# Patient Record
Sex: MALE | Race: WHITE | Employment: PART TIME | URBAN - METROPOLITAN AREA
[De-identification: names, ages, dates, MRNs, and addresses within clinical notes are randomized per-mention and may not be internally consistent; named-entity substitution may affect disease eponyms.]

---

## 2020-09-18 ENCOUNTER — HOSPITAL ENCOUNTER (EMERGENCY)
Facility: CLINIC | Age: 50
Discharge: OTHER INSTITUTION | End: 2020-09-18
Attending: EMERGENCY MEDICINE
Payer: MEDICAID

## 2020-09-18 ENCOUNTER — HOSPITAL ENCOUNTER (EMERGENCY)
Facility: CLINIC | Age: 50
End: 2020-09-18

## 2020-09-18 ENCOUNTER — APPOINTMENT (OUTPATIENT)
Dept: CT IMAGING | Facility: CLINIC | Age: 50
End: 2020-09-18
Attending: EMERGENCY MEDICINE
Payer: MEDICAID

## 2020-09-18 ENCOUNTER — HOSPITAL ENCOUNTER (INPATIENT)
Facility: CLINIC | Age: 50
LOS: 7 days | Discharge: HOME | DRG: 896 | End: 2020-09-25
Attending: HOSPITALIST | Admitting: INTERNAL MEDICINE
Payer: MEDICAID

## 2020-09-18 ENCOUNTER — TELEPHONE (OUTPATIENT)
Dept: BEHAVIORAL HEALTH | Facility: CLINIC | Age: 50
End: 2020-09-18

## 2020-09-18 VITALS
SYSTOLIC BLOOD PRESSURE: 120 MMHG | TEMPERATURE: 97.6 F | RESPIRATION RATE: 18 BRPM | HEART RATE: 109 BPM | OXYGEN SATURATION: 99 % | DIASTOLIC BLOOD PRESSURE: 75 MMHG | WEIGHT: 168 LBS

## 2020-09-18 DIAGNOSIS — F10.931 ALCOHOL WITHDRAWAL, WITH DELIRIUM (H): ICD-10-CM

## 2020-09-18 DIAGNOSIS — R79.89 LFT ELEVATION: ICD-10-CM

## 2020-09-18 DIAGNOSIS — Z20.828 EXPOSURE TO SARS-ASSOCIATED CORONAVIRUS: ICD-10-CM

## 2020-09-18 DIAGNOSIS — E87.6 HYPOKALEMIA: ICD-10-CM

## 2020-09-18 DIAGNOSIS — F10.10 ETOH ABUSE: Primary | ICD-10-CM

## 2020-09-18 DIAGNOSIS — I10 BENIGN ESSENTIAL HYPERTENSION: ICD-10-CM

## 2020-09-18 PROBLEM — F10.939 ALCOHOL WITHDRAWAL (H): Status: ACTIVE | Noted: 2020-09-18

## 2020-09-18 LAB
ALBUMIN SERPL-MCNC: 3.3 G/DL (ref 3.4–5)
ALBUMIN UR-MCNC: NEGATIVE MG/DL
ALP SERPL-CCNC: 117 U/L (ref 40–150)
ALT SERPL W P-5'-P-CCNC: 85 U/L (ref 0–70)
ANION GAP SERPL CALCULATED.3IONS-SCNC: 10 MMOL/L (ref 3–14)
APPEARANCE UR: CLEAR
AST SERPL W P-5'-P-CCNC: 158 U/L (ref 0–45)
BASOPHILS # BLD AUTO: 0 10E9/L (ref 0–0.2)
BASOPHILS NFR BLD AUTO: 0.1 %
BILIRUB SERPL-MCNC: 2.1 MG/DL (ref 0.2–1.3)
BILIRUB UR QL STRIP: NEGATIVE
BUN SERPL-MCNC: 14 MG/DL (ref 7–30)
CALCIUM SERPL-MCNC: 8.9 MG/DL (ref 8.5–10.1)
CHLORIDE SERPL-SCNC: 90 MMOL/L (ref 94–109)
CK SERPL-CCNC: 315 U/L (ref 30–300)
CO2 SERPL-SCNC: 30 MMOL/L (ref 20–32)
COLOR UR AUTO: ABNORMAL
CREAT SERPL-MCNC: 0.59 MG/DL (ref 0.66–1.25)
DIFFERENTIAL METHOD BLD: ABNORMAL
EOSINOPHIL # BLD AUTO: 0 10E9/L (ref 0–0.7)
EOSINOPHIL NFR BLD AUTO: 0.3 %
ERYTHROCYTE [DISTWIDTH] IN BLOOD BY AUTOMATED COUNT: 12.1 % (ref 10–15)
ETHANOL SERPL-MCNC: <0.01 G/DL
GFR SERPL CREATININE-BSD FRML MDRD: >90 ML/MIN/{1.73_M2}
GLUCOSE SERPL-MCNC: 111 MG/DL (ref 70–99)
GLUCOSE UR STRIP-MCNC: 70 MG/DL
HCT VFR BLD AUTO: 35.3 % (ref 40–53)
HGB BLD-MCNC: 13.3 G/DL (ref 13.3–17.7)
HGB UR QL STRIP: NEGATIVE
IMM GRANULOCYTES # BLD: 0.1 10E9/L (ref 0–0.4)
IMM GRANULOCYTES NFR BLD: 0.6 %
KETONES UR STRIP-MCNC: 5 MG/DL
LABORATORY COMMENT REPORT: NORMAL
LACTATE BLD-SCNC: 1.8 MMOL/L (ref 0.7–2)
LEUKOCYTE ESTERASE UR QL STRIP: NEGATIVE
LYMPHOCYTES # BLD AUTO: 1 10E9/L (ref 0.8–5.3)
LYMPHOCYTES NFR BLD AUTO: 10.7 %
MAGNESIUM SERPL-MCNC: 2.2 MG/DL (ref 1.6–2.3)
MCH RBC QN AUTO: 34.9 PG (ref 26.5–33)
MCHC RBC AUTO-ENTMCNC: 37.7 G/DL (ref 31.5–36.5)
MCV RBC AUTO: 93 FL (ref 78–100)
MONOCYTES # BLD AUTO: 1.3 10E9/L (ref 0–1.3)
MONOCYTES NFR BLD AUTO: 14.2 %
NEUTROPHILS # BLD AUTO: 6.6 10E9/L (ref 1.6–8.3)
NEUTROPHILS NFR BLD AUTO: 74.1 %
NITRATE UR QL: NEGATIVE
NRBC # BLD AUTO: 0.1 10*3/UL
NRBC BLD AUTO-RTO: 1 /100
PH UR STRIP: 6 PH (ref 5–7)
PHOSPHATE SERPL-MCNC: 1.1 MG/DL (ref 2.5–4.5)
PLATELET # BLD AUTO: 256 10E9/L (ref 150–450)
POTASSIUM SERPL-SCNC: 2.4 MMOL/L (ref 3.4–5.3)
POTASSIUM SERPL-SCNC: 2.7 MMOL/L (ref 3.4–5.3)
PROT SERPL-MCNC: 6.6 G/DL (ref 6.8–8.8)
RBC # BLD AUTO: 3.81 10E12/L (ref 4.4–5.9)
SARS-COV-2 RNA SPEC QL NAA+PROBE: NEGATIVE
SARS-COV-2 RNA SPEC QL NAA+PROBE: NORMAL
SODIUM SERPL-SCNC: 130 MMOL/L (ref 133–144)
SOURCE: ABNORMAL
SP GR UR STRIP: 1 (ref 1–1.03)
SPECIMEN SOURCE: NORMAL
SPECIMEN SOURCE: NORMAL
UROBILINOGEN UR STRIP-MCNC: NORMAL MG/DL (ref 0–2)
WBC # BLD AUTO: 9 10E9/L (ref 4–11)

## 2020-09-18 PROCEDURE — 96361 HYDRATE IV INFUSION ADD-ON: CPT

## 2020-09-18 PROCEDURE — 96365 THER/PROPH/DIAG IV INF INIT: CPT

## 2020-09-18 PROCEDURE — 12000000 ZZH R&B MED SURG/OB

## 2020-09-18 PROCEDURE — 25000132 ZZH RX MED GY IP 250 OP 250 PS 637: Performed by: EMERGENCY MEDICINE

## 2020-09-18 PROCEDURE — 36415 COLL VENOUS BLD VENIPUNCTURE: CPT

## 2020-09-18 PROCEDURE — C9803 HOPD COVID-19 SPEC COLLECT: HCPCS

## 2020-09-18 PROCEDURE — 25000128 H RX IP 250 OP 636: Performed by: EMERGENCY MEDICINE

## 2020-09-18 PROCEDURE — 93005 ELECTROCARDIOGRAM TRACING: CPT | Mod: 76

## 2020-09-18 PROCEDURE — 93005 ELECTROCARDIOGRAM TRACING: CPT

## 2020-09-18 PROCEDURE — 36415 COLL VENOUS BLD VENIPUNCTURE: CPT | Performed by: INTERNAL MEDICINE

## 2020-09-18 PROCEDURE — U0003 INFECTIOUS AGENT DETECTION BY NUCLEIC ACID (DNA OR RNA); SEVERE ACUTE RESPIRATORY SYNDROME CORONAVIRUS 2 (SARS-COV-2) (CORONAVIRUS DISEASE [COVID-19]), AMPLIFIED PROBE TECHNIQUE, MAKING USE OF HIGH THROUGHPUT TECHNOLOGIES AS DESCRIBED BY CMS-2020-01-R: HCPCS | Performed by: EMERGENCY MEDICINE

## 2020-09-18 PROCEDURE — 70450 CT HEAD/BRAIN W/O DYE: CPT

## 2020-09-18 PROCEDURE — 80053 COMPREHEN METABOLIC PANEL: CPT | Performed by: EMERGENCY MEDICINE

## 2020-09-18 PROCEDURE — 99223 1ST HOSP IP/OBS HIGH 75: CPT | Mod: AI | Performed by: INTERNAL MEDICINE

## 2020-09-18 PROCEDURE — 99285 EMERGENCY DEPT VISIT HI MDM: CPT | Mod: 25 | Performed by: EMERGENCY MEDICINE

## 2020-09-18 PROCEDURE — 83605 ASSAY OF LACTIC ACID: CPT | Performed by: EMERGENCY MEDICINE

## 2020-09-18 PROCEDURE — 83735 ASSAY OF MAGNESIUM: CPT | Performed by: EMERGENCY MEDICINE

## 2020-09-18 PROCEDURE — 93010 ELECTROCARDIOGRAM REPORT: CPT | Mod: 76 | Performed by: EMERGENCY MEDICINE

## 2020-09-18 PROCEDURE — 99285 EMERGENCY DEPT VISIT HI MDM: CPT | Mod: 25

## 2020-09-18 PROCEDURE — 80320 DRUG SCREEN QUANTALCOHOLS: CPT | Performed by: EMERGENCY MEDICINE

## 2020-09-18 PROCEDURE — 25800030 ZZH RX IP 258 OP 636: Performed by: EMERGENCY MEDICINE

## 2020-09-18 PROCEDURE — 25000132 ZZH RX MED GY IP 250 OP 250 PS 637: Performed by: INTERNAL MEDICINE

## 2020-09-18 PROCEDURE — 82550 ASSAY OF CK (CPK): CPT | Performed by: EMERGENCY MEDICINE

## 2020-09-18 PROCEDURE — 81003 URINALYSIS AUTO W/O SCOPE: CPT | Mod: XU | Performed by: EMERGENCY MEDICINE

## 2020-09-18 PROCEDURE — 96366 THER/PROPH/DIAG IV INF ADDON: CPT

## 2020-09-18 PROCEDURE — HZ2ZZZZ DETOXIFICATION SERVICES FOR SUBSTANCE ABUSE TREATMENT: ICD-10-PCS | Performed by: INTERNAL MEDICINE

## 2020-09-18 PROCEDURE — 93010 ELECTROCARDIOGRAM REPORT: CPT | Mod: Z6 | Performed by: EMERGENCY MEDICINE

## 2020-09-18 PROCEDURE — 25000128 H RX IP 250 OP 636: Performed by: INTERNAL MEDICINE

## 2020-09-18 PROCEDURE — 84132 ASSAY OF SERUM POTASSIUM: CPT | Performed by: INTERNAL MEDICINE

## 2020-09-18 PROCEDURE — 25000125 ZZHC RX 250: Performed by: EMERGENCY MEDICINE

## 2020-09-18 PROCEDURE — 85025 COMPLETE CBC W/AUTO DIFF WBC: CPT | Performed by: EMERGENCY MEDICINE

## 2020-09-18 PROCEDURE — 84100 ASSAY OF PHOSPHORUS: CPT | Performed by: INTERNAL MEDICINE

## 2020-09-18 RX ORDER — POTASSIUM CHLORIDE 7.45 MG/ML
10 INJECTION INTRAVENOUS
Status: DISCONTINUED | OUTPATIENT
Start: 2020-09-18 | End: 2020-09-25 | Stop reason: HOSPADM

## 2020-09-18 RX ORDER — MULTIPLE VITAMINS W/ MINERALS TAB 9MG-400MCG
1 TAB ORAL DAILY
Status: DISCONTINUED | OUTPATIENT
Start: 2020-09-19 | End: 2020-09-25 | Stop reason: HOSPADM

## 2020-09-18 RX ORDER — FOLIC ACID 1 MG/1
1 TABLET ORAL DAILY
Status: DISCONTINUED | OUTPATIENT
Start: 2020-09-19 | End: 2020-09-25 | Stop reason: HOSPADM

## 2020-09-18 RX ORDER — GABAPENTIN 300 MG/1
600 CAPSULE ORAL EVERY 8 HOURS
Status: DISCONTINUED | OUTPATIENT
Start: 2020-09-22 | End: 2020-09-21

## 2020-09-18 RX ORDER — PROCHLORPERAZINE 25 MG
25 SUPPOSITORY, RECTAL RECTAL EVERY 12 HOURS PRN
Status: DISCONTINUED | OUTPATIENT
Start: 2020-09-18 | End: 2020-09-25 | Stop reason: HOSPADM

## 2020-09-18 RX ORDER — LORAZEPAM 2 MG/ML
1-2 INJECTION INTRAMUSCULAR EVERY 30 MIN PRN
Status: DISCONTINUED | OUTPATIENT
Start: 2020-09-18 | End: 2020-09-25 | Stop reason: HOSPADM

## 2020-09-18 RX ORDER — GABAPENTIN 300 MG/1
300 CAPSULE ORAL EVERY 8 HOURS
Status: DISCONTINUED | OUTPATIENT
Start: 2020-09-24 | End: 2020-09-21

## 2020-09-18 RX ORDER — GABAPENTIN 300 MG/1
900 CAPSULE ORAL EVERY 8 HOURS
Status: DISCONTINUED | OUTPATIENT
Start: 2020-09-19 | End: 2020-09-21

## 2020-09-18 RX ORDER — POTASSIUM CL/LIDO/0.9 % NACL 10MEQ/0.1L
10 INTRAVENOUS SOLUTION, PIGGYBACK (ML) INTRAVENOUS
Status: DISCONTINUED | OUTPATIENT
Start: 2020-09-18 | End: 2020-09-25 | Stop reason: HOSPADM

## 2020-09-18 RX ORDER — PROCHLORPERAZINE MALEATE 10 MG
10 TABLET ORAL EVERY 6 HOURS PRN
Status: DISCONTINUED | OUTPATIENT
Start: 2020-09-18 | End: 2020-09-25 | Stop reason: HOSPADM

## 2020-09-18 RX ORDER — NALOXONE HYDROCHLORIDE 0.4 MG/ML
.1-.4 INJECTION, SOLUTION INTRAMUSCULAR; INTRAVENOUS; SUBCUTANEOUS
Status: DISCONTINUED | OUTPATIENT
Start: 2020-09-18 | End: 2020-09-25 | Stop reason: HOSPADM

## 2020-09-18 RX ORDER — LANOLIN ALCOHOL/MO/W.PET/CERES
100 CREAM (GRAM) TOPICAL DAILY
Status: DISCONTINUED | OUTPATIENT
Start: 2020-09-26 | End: 2020-09-25 | Stop reason: HOSPADM

## 2020-09-18 RX ORDER — POTASSIUM CHLORIDE 1.5 G/1.58G
40 POWDER, FOR SOLUTION ORAL ONCE
Status: COMPLETED | OUTPATIENT
Start: 2020-09-18 | End: 2020-09-18

## 2020-09-18 RX ORDER — LIDOCAINE 40 MG/G
CREAM TOPICAL
Status: DISCONTINUED | OUTPATIENT
Start: 2020-09-18 | End: 2020-09-25 | Stop reason: HOSPADM

## 2020-09-18 RX ORDER — LANOLIN ALCOHOL/MO/W.PET/CERES
100 CREAM (GRAM) TOPICAL 3 TIMES DAILY
Status: DISCONTINUED | OUTPATIENT
Start: 2020-09-20 | End: 2020-09-25 | Stop reason: HOSPADM

## 2020-09-18 RX ORDER — POLYETHYLENE GLYCOL 3350 17 G/17G
17 POWDER, FOR SOLUTION ORAL DAILY PRN
Status: DISCONTINUED | OUTPATIENT
Start: 2020-09-18 | End: 2020-09-25 | Stop reason: HOSPADM

## 2020-09-18 RX ORDER — AMOXICILLIN 250 MG
2 CAPSULE ORAL 2 TIMES DAILY PRN
Status: DISCONTINUED | OUTPATIENT
Start: 2020-09-18 | End: 2020-09-25 | Stop reason: HOSPADM

## 2020-09-18 RX ORDER — HALOPERIDOL 5 MG/ML
2.5-5 INJECTION INTRAMUSCULAR EVERY 6 HOURS PRN
Status: DISCONTINUED | OUTPATIENT
Start: 2020-09-18 | End: 2020-09-25 | Stop reason: HOSPADM

## 2020-09-18 RX ORDER — METOPROLOL TARTRATE 50 MG
50 TABLET ORAL 2 TIMES DAILY
Status: DISCONTINUED | OUTPATIENT
Start: 2020-09-19 | End: 2020-09-25 | Stop reason: HOSPADM

## 2020-09-18 RX ORDER — LANOLIN ALCOHOL/MO/W.PET/CERES
200 CREAM (GRAM) TOPICAL 3 TIMES DAILY
Status: COMPLETED | OUTPATIENT
Start: 2020-09-18 | End: 2020-09-20

## 2020-09-18 RX ORDER — AMOXICILLIN 250 MG
1 CAPSULE ORAL 2 TIMES DAILY PRN
Status: DISCONTINUED | OUTPATIENT
Start: 2020-09-18 | End: 2020-09-25 | Stop reason: HOSPADM

## 2020-09-18 RX ORDER — MULTIPLE VITAMINS W/ MINERALS TAB 9MG-400MCG
1 TAB ORAL DAILY
COMMUNITY

## 2020-09-18 RX ORDER — POTASSIUM CHLORIDE 29.8 MG/ML
20 INJECTION INTRAVENOUS
Status: DISCONTINUED | OUTPATIENT
Start: 2020-09-18 | End: 2020-09-25 | Stop reason: HOSPADM

## 2020-09-18 RX ORDER — ONDANSETRON 2 MG/ML
4 INJECTION INTRAMUSCULAR; INTRAVENOUS EVERY 6 HOURS PRN
Status: DISCONTINUED | OUTPATIENT
Start: 2020-09-18 | End: 2020-09-25 | Stop reason: HOSPADM

## 2020-09-18 RX ORDER — GABAPENTIN 100 MG/1
100 CAPSULE ORAL EVERY 8 HOURS
Status: DISCONTINUED | OUTPATIENT
Start: 2020-09-26 | End: 2020-09-21

## 2020-09-18 RX ORDER — ONDANSETRON 4 MG/1
4 TABLET, ORALLY DISINTEGRATING ORAL EVERY 6 HOURS PRN
Status: DISCONTINUED | OUTPATIENT
Start: 2020-09-18 | End: 2020-09-25 | Stop reason: HOSPADM

## 2020-09-18 RX ORDER — POTASSIUM CHLORIDE 1.5 G/1.58G
20-40 POWDER, FOR SOLUTION ORAL
Status: DISCONTINUED | OUTPATIENT
Start: 2020-09-18 | End: 2020-09-25 | Stop reason: HOSPADM

## 2020-09-18 RX ORDER — LORAZEPAM 1 MG/1
1-2 TABLET ORAL EVERY 30 MIN PRN
Status: DISCONTINUED | OUTPATIENT
Start: 2020-09-18 | End: 2020-09-25 | Stop reason: HOSPADM

## 2020-09-18 RX ORDER — FLUMAZENIL 0.1 MG/ML
0.2 INJECTION, SOLUTION INTRAVENOUS
Status: DISCONTINUED | OUTPATIENT
Start: 2020-09-18 | End: 2020-09-25 | Stop reason: HOSPADM

## 2020-09-18 RX ORDER — POTASSIUM CHLORIDE 1500 MG/1
20-40 TABLET, EXTENDED RELEASE ORAL
Status: DISCONTINUED | OUTPATIENT
Start: 2020-09-18 | End: 2020-09-25 | Stop reason: HOSPADM

## 2020-09-18 RX ORDER — SODIUM CHLORIDE AND POTASSIUM CHLORIDE 150; 900 MG/100ML; MG/100ML
INJECTION, SOLUTION INTRAVENOUS CONTINUOUS
Status: DISCONTINUED | OUTPATIENT
Start: 2020-09-18 | End: 2020-09-21

## 2020-09-18 RX ORDER — OLANZAPINE 5 MG/1
5-10 TABLET, ORALLY DISINTEGRATING ORAL EVERY 6 HOURS PRN
Status: DISCONTINUED | OUTPATIENT
Start: 2020-09-18 | End: 2020-09-25 | Stop reason: HOSPADM

## 2020-09-18 RX ORDER — LORAZEPAM 1 MG/1
2 TABLET ORAL ONCE
Status: COMPLETED | OUTPATIENT
Start: 2020-09-18 | End: 2020-09-18

## 2020-09-18 RX ORDER — METOPROLOL TARTRATE 50 MG
50 TABLET ORAL 2 TIMES DAILY
Status: ON HOLD | COMMUNITY
Start: 2020-08-07 | End: 2020-09-25

## 2020-09-18 RX ORDER — MAGNESIUM SULFATE HEPTAHYDRATE 40 MG/ML
4 INJECTION, SOLUTION INTRAVENOUS EVERY 4 HOURS PRN
Status: DISCONTINUED | OUTPATIENT
Start: 2020-09-18 | End: 2020-09-25 | Stop reason: HOSPADM

## 2020-09-18 RX ORDER — SODIUM CHLORIDE 9 MG/ML
INJECTION, SOLUTION INTRAVENOUS CONTINUOUS
Status: DISCONTINUED | OUTPATIENT
Start: 2020-09-18 | End: 2020-09-18 | Stop reason: HOSPADM

## 2020-09-18 RX ORDER — GABAPENTIN 600 MG/1
1200 TABLET ORAL ONCE
Status: COMPLETED | OUTPATIENT
Start: 2020-09-18 | End: 2020-09-18

## 2020-09-18 RX ADMIN — POTASSIUM CHLORIDE 40 MEQ: 1.5 POWDER, FOR SOLUTION ORAL at 16:31

## 2020-09-18 RX ADMIN — GABAPENTIN 1200 MG: 600 TABLET, FILM COATED ORAL at 20:09

## 2020-09-18 RX ADMIN — POTASSIUM CHLORIDE AND SODIUM CHLORIDE: 900; 150 INJECTION, SOLUTION INTRAVENOUS at 21:11

## 2020-09-18 RX ADMIN — POTASSIUM CHLORIDE 40 MEQ: 1.5 POWDER, FOR SOLUTION ORAL at 14:05

## 2020-09-18 RX ADMIN — THIAMINE HYDROCHLORIDE: 100 INJECTION, SOLUTION INTRAMUSCULAR; INTRAVENOUS at 14:45

## 2020-09-18 RX ADMIN — LORAZEPAM 1 MG: 1 TABLET ORAL at 20:09

## 2020-09-18 RX ADMIN — SODIUM CHLORIDE 1000 ML: 9 INJECTION, SOLUTION INTRAVENOUS at 13:23

## 2020-09-18 RX ADMIN — LORAZEPAM 2 MG: 1 TABLET ORAL at 16:29

## 2020-09-18 RX ADMIN — POTASSIUM CHLORIDE 40 MEQ: 1.5 POWDER, FOR SOLUTION ORAL at 21:54

## 2020-09-18 RX ADMIN — THIAMINE HCL TAB 100 MG 200 MG: 100 TAB at 21:54

## 2020-09-18 RX ADMIN — SODIUM CHLORIDE 1000 ML: 9 INJECTION, SOLUTION INTRAVENOUS at 17:37

## 2020-09-18 ASSESSMENT — ACTIVITIES OF DAILY LIVING (ADL): ADLS_ACUITY_SCORE: 18

## 2020-09-18 ASSESSMENT — ENCOUNTER SYMPTOMS
DIFFICULTY URINATING: 0
NECK STIFFNESS: 0
COLOR CHANGE: 0
HALLUCINATIONS: 1
ARTHRALGIAS: 0
SHORTNESS OF BREATH: 0
VOMITING: 0
HEADACHES: 0
ABDOMINAL PAIN: 0
CONFUSION: 0
EYE REDNESS: 0
FEVER: 0
NAUSEA: 0

## 2020-09-18 NOTE — ED PROVIDER NOTES
ED Provider Note  [facility]      History     Chief Complaint   Patient presents with     Alcohol Problem     Last drink this weekend.  States he drinks 1 beer ad 6 shots per day.  Varies.  Started drinking last Dec/ Jan.  Drinks @ night.     Chest Pain     Left sided chewst pain.  States it started last night.  Comes and goes.  Lasts about 1 1/2 hours.  Bruise noted to left anterior chest.     Fall     Pt has bruises to left chest, left shoulder and left arm.     The history is provided by the patient and a parent.     Omega Guthrie is a 50 year old male with a history of hypertension and alcohol abuse who presents to the Emergency Department seeking detox from alcohol. The patient states that he has been drinking 3-4 shots of hard liquor and a beer every other day, his last drink was yesterday. The patient states he started drinking again 6 months ago. He states that he relapsed from a 6-year sobriety. The patient admits to hallucinations that began this morning, but the patient's mother states he was hallucinating yesterday. The patient has a history of alcohol withdrawal seizures and DTs. He reports his last instance of this was in December 2009. He denies nausea and vomiting. He denies the use of other drugs.     The patient reports that he has fallen twice in the last and a half weeks, and reports a large bruise to his left chest with associated chest pain.     Past Medical History  Past Medical History:   Diagnosis Date     Hypertension      Seizures (H)      Substance abuse (H) 2010    Rehab     Past Surgical History:   Procedure Laterality Date     EYE SURGERY       metoprolol tartrate (LOPRESSOR) 50 MG tablet  multivitamin w/minerals (MULTI-VITAMIN) tablet      Allergies   Allergen Reactions     Penicillins Other (See Comments)     Facial rash and pain     Family History  No family history on file.  Social History   Social History     Tobacco Use     Smoking status: Never  Smoker     Smokeless tobacco: Never Used   Substance Use Topics     Alcohol use: Yes     Drug use: Never      Past medical history, past surgical history, medications, allergies, family history, and social history were reviewed with the patient. No additional pertinent items.       Review of Systems   Constitutional: Negative for fever.   HENT: Negative for congestion.    Eyes: Negative for redness.   Respiratory: Negative for shortness of breath.    Cardiovascular: Negative for chest pain.   Gastrointestinal: Negative for abdominal pain, nausea and vomiting.   Genitourinary: Negative for difficulty urinating.   Musculoskeletal: Negative for arthralgias and neck stiffness.   Skin: Negative for color change.        Positive for bruise to L chest w/associated pain   Neurological: Negative for headaches.   Psychiatric/Behavioral: Positive for hallucinations. Negative for confusion.   All other systems reviewed and are negative.    Physical Exam   BP: (!) 123/92  Pulse: 93  Temp: 97  F (36.1  C)  Resp: 16  Weight: 76.2 kg (168 lb)  SpO2: 98 %  Physical Exam  Constitutional:       General: He is not in acute distress.     Appearance: He is not diaphoretic.   HENT:      Head: Atraumatic.   Eyes:      General: No scleral icterus.     Pupils: Pupils are equal, round, and reactive to light.   Cardiovascular:      Heart sounds: Normal heart sounds.   Pulmonary:      Effort: No respiratory distress.      Breath sounds: Normal breath sounds.   Abdominal:      General: Bowel sounds are normal.      Palpations: Abdomen is soft.      Tenderness: There is no abdominal tenderness.   Musculoskeletal:         General: No tenderness.   Skin:     General: Skin is warm.      Findings: No rash.   Neurological:      Motor: Tremor present.         ED Course       12:47 PM  The patient was seen and examined by Carlos Cutler MD in Room ED11.   Procedures             EKG Interpretation:      Interpreted by Carlos Cutler MD  Time reviewed:  12:44 PM  Symptoms at time of EKG: Left-sided chest pain  Rhythm: Normal sinus   Rate: Normal  Axis: Normal  Ectopy: None  Conduction: Normal  ST Segments/ T Waves: No ST-T wave changes, No acute ischemic changes and QTc prolongation   Q Waves: None  Comparison to prior: No old EKG available    Clinical Impression: no acute changes           EKG Interpretation:      Interpreted by Carlos Cutler MD  Time reviewed: 2:32 PM  Symptoms at time of EKG: Left-sided chest pain  Rhythm: Normal sinus   Rate: Normal  Axis: Normal  Ectopy: None  Conduction: Normal and Left posterior fasciclar block  ST Segments/ T Waves: No ST-T wave changes and No acute ischemic changes, QTC prolongation  Q Waves: None  Comparison to prior: Compared with EKG from 2 hours earlier, QRS deflection in leads I and aVL are now negative whereas aVR is now positive    Clinical Impression: no acute changes             Results for orders placed or performed during the hospital encounter of 09/18/20   CBC with platelets differential     Status: Abnormal   Result Value Ref Range    WBC 9.0 4.0 - 11.0 10e9/L    RBC Count 3.81 (L) 4.4 - 5.9 10e12/L    Hemoglobin 13.3 13.3 - 17.7 g/dL    Hematocrit 35.3 (L) 40.0 - 53.0 %    MCV 93 78 - 100 fl    MCH 34.9 (H) 26.5 - 33.0 pg    MCHC 37.7 (H) 31.5 - 36.5 g/dL    RDW 12.1 10.0 - 15.0 %    Platelet Count 256 150 - 450 10e9/L    Diff Method Automated Method     % Neutrophils 74.1 %    % Lymphocytes 10.7 %    % Monocytes 14.2 %    % Eosinophils 0.3 %    % Basophils 0.1 %    % Immature Granulocytes 0.6 %    Nucleated RBCs 1 (H) 0 /100    Absolute Neutrophil 6.6 1.6 - 8.3 10e9/L    Absolute Lymphocytes 1.0 0.8 - 5.3 10e9/L    Absolute Monocytes 1.3 0.0 - 1.3 10e9/L    Absolute Eosinophils 0.0 0.0 - 0.7 10e9/L    Absolute Basophils 0.0 0.0 - 0.2 10e9/L    Abs Immature Granulocytes 0.1 0 - 0.4 10e9/L    Absolute Nucleated RBC 0.1    Comprehensive metabolic panel     Status: Abnormal   Result Value Ref Range    Sodium  130 (L) 133 - 144 mmol/L    Potassium 2.4 (LL) 3.4 - 5.3 mmol/L    Chloride 90 (L) 94 - 109 mmol/L    Carbon Dioxide 30 20 - 32 mmol/L    Anion Gap 10 3 - 14 mmol/L    Glucose 111 (H) 70 - 99 mg/dL    Urea Nitrogen 14 7 - 30 mg/dL    Creatinine 0.59 (L) 0.66 - 1.25 mg/dL    GFR Estimate >90 >60 mL/min/[1.73_m2]    GFR Estimate If Black >90 >60 mL/min/[1.73_m2]    Calcium 8.9 8.5 - 10.1 mg/dL    Bilirubin Total 2.1 (H) 0.2 - 1.3 mg/dL    Albumin 3.3 (L) 3.4 - 5.0 g/dL    Protein Total 6.6 (L) 6.8 - 8.8 g/dL    Alkaline Phosphatase 117 40 - 150 U/L    ALT 85 (H) 0 - 70 U/L     (H) 0 - 45 U/L   Lactic acid whole blood     Status: None   Result Value Ref Range    Lactic Acid 1.8 0.7 - 2.0 mmol/L   Alcohol ethyl     Status: None   Result Value Ref Range    Ethanol g/dL <0.01 <0.01 g/dL   Magnesium     Status: None   Result Value Ref Range    Magnesium 2.2 1.6 - 2.3 mg/dL   CK total     Status: Abnormal   Result Value Ref Range    CK Total 315 (H) 30 - 300 U/L   Asymptomatic COVID-19 Virus (Coronavirus) by PCR     Status: None    Specimen: Nasopharyngeal   Result Value Ref Range    COVID-19 Virus PCR to [facility] - Source Nasopharyngeal     COVID-19 Virus PCR to [facility] - Result       Test received-See reflex to IDDL test SARS CoV2 (COVID-19) Virus RT-PCR   EKG 12 lead     Status: None (Preliminary result)   Result Value Ref Range    Interpretation ECG Click View Image link to view waveform and result    EKG 12 lead     Status: None (Preliminary result)   Result Value Ref Range    Interpretation ECG Click View Image link to view waveform and result      Medications   0.9% sodium chloride BOLUS (0 mLs Intravenous Stopped 9/18/20 1423)     Followed by   0.9% sodium chloride BOLUS (has no administration in time range)     Followed by   sodium chloride 0.9% infusion (has no administration in time range)   LORazepam (ATIVAN) tablet 2 mg (has no administration in time range)   potassium chloride (KLOR-CON) Packet 40 mEq  (has no administration in time range)   sodium chloride 0.9 % 1,000 mL with Infuvite Adult 10 mL, thiamine 100 mg, folic acid 1 mg, magnesium sulfate 2 g infusion ( Intravenous New Bag 9/18/20 1445)   potassium chloride (KLOR-CON) Packet 40 mEq (40 mEq Oral Given 9/18/20 1405)        Assessments & Plan (with Medical Decision Making)   50-year-old male presents with tremulousness and confusion in the setting of recent cessation from heavy use of alcohol.  Exam revealed the patient to be oriented to person with tremulousness.  He was noted to have bruising over his anterior chest and left shoulder consistent with recent falls.  Laboratories were obtained including comprehensive metabolic panel revealing hypokalemia as well as elevations in bilirubin, ALT and AST consistent with acute on or chronic alcohol abuse.  CBC was grossly unremarkable.  Patient was treated with IV normal saline, Ativan, potassium, magnesium, thiamine, folate.  He received a second dose of potassium.  The case was discussed at length with hospitalist from [facility].  Patient was agreeable to transportation there.  Appropriate paperwork was filled out.  I have reviewed the nursing notes. I have reviewed the findings, diagnosis, plan and need for follow up with the patient.    New Prescriptions    No medications on file       Final diagnoses:   Alcohol withdrawal, with delirium (H)   Hypokalemia   LFT elevation     I, Sunni Gordon, am serving as a trained medical scribe to document services personally performed by Carlos Cutler MD, based on the provider's statements to me.     I, Carlos Cutler MD, was physically present and have reviewed and verified the accuracy of this note documented by Sunni Gordon.    --  Carlos Cutler MD  [facility], EMERGENCY DEPARTMENT  9/18/2020     Carlos Cutler MD  09/18/20 160       Carlos Cutler MD  09/21/20 1108

## 2020-09-18 NOTE — TELEPHONE ENCOUNTER
S: 1:30 PM  Call from ED provider requesting detox bed for a 49 YO M     B:  Patient relapsed w/ETOH 5 years ago, drinking intermittently and started drinking daily 6 months ago- reports drinking multiple shots and beers daily-last drink 9/17/20-unknown time. Patient's  mom reports he was shakey, and hallucinating yesterday.  Patient does have a history of ETOH withdrawl  seizures and DTs in 2009.       Hx of HTN-takes medication--/98  Today at 1 PM    Labs:  Completed  Lactic acid-1.8  CBC w/ platelets-   RBC-(L) 3.81 hematocrit-(L)35.3  MCH-(H) 34.9 MCHC-(H) 37.7,  Nucleated RBCs-(H)1    Labs:  In Process  COVID-19  CK Total  Magnesium  Alcohol Ethyl  CMP    Labs:  Not collected yet  UA    Patient started on MSSA protocol    A:  voluntary    R:  Patient cleared and ready for detox  bed placement: No  Lab work still pending    R:  2:30 PM  Lab work not back yet.

## 2020-09-18 NOTE — PHARMACY-ADMISSION MEDICATION HISTORY
Admission Medication History Completed by Pharmacy    See [facility] Admission Navigator for allergy information, preferred outpatient pharmacy, prior to admission medications and immunization status.     Medication History Sources:     Patient    Care Everywhere ([facility])    Changes made to PTA medication list (reason):    Added: None    Deleted: None    Changed: None    Additional Information:    None    Prior to Admission medications    Medication Sig Last Dose Taking? Auth Provider   metoprolol tartrate (LOPRESSOR) 50 MG tablet Take 50 mg by mouth 2 times daily 9/17/2020 at PM Yes Reported, Patient   multivitamin w/minerals (MULTI-VITAMIN) tablet Take 1 tablet by mouth daily Past Week at Unknown time Yes Reported, Patient       Date completed: 09/18/20    Medication history completed by: Allie Argueta [facility]

## 2020-09-19 LAB
ALBUMIN SERPL-MCNC: 2.8 G/DL (ref 3.4–5)
ALP SERPL-CCNC: 98 U/L (ref 40–150)
ALT SERPL W P-5'-P-CCNC: 76 U/L (ref 0–70)
ANION GAP SERPL CALCULATED.3IONS-SCNC: 4 MMOL/L (ref 3–14)
AST SERPL W P-5'-P-CCNC: 121 U/L (ref 0–45)
BILIRUB SERPL-MCNC: 1.5 MG/DL (ref 0.2–1.3)
BUN SERPL-MCNC: 6 MG/DL (ref 7–30)
CALCIUM SERPL-MCNC: 8 MG/DL (ref 8.5–10.1)
CHLORIDE SERPL-SCNC: 107 MMOL/L (ref 94–109)
CK SERPL-CCNC: 144 U/L (ref 30–300)
CO2 SERPL-SCNC: 28 MMOL/L (ref 20–32)
CREAT SERPL-MCNC: 0.68 MG/DL (ref 0.66–1.25)
ERYTHROCYTE [DISTWIDTH] IN BLOOD BY AUTOMATED COUNT: 12.6 % (ref 10–15)
GFR SERPL CREATININE-BSD FRML MDRD: >90 ML/MIN/{1.73_M2}
GLUCOSE SERPL-MCNC: 94 MG/DL (ref 70–99)
HCT VFR BLD AUTO: 33.3 % (ref 40–53)
HGB BLD-MCNC: 11.3 G/DL (ref 13.3–17.7)
INTERPRETATION ECG - MUSE: NORMAL
INTERPRETATION ECG - MUSE: NORMAL
MCH RBC QN AUTO: 34 PG (ref 26.5–33)
MCHC RBC AUTO-ENTMCNC: 33.9 G/DL (ref 31.5–36.5)
MCV RBC AUTO: 100 FL (ref 78–100)
PHOSPHATE SERPL-MCNC: 2 MG/DL (ref 2.5–4.5)
PLATELET # BLD AUTO: 226 10E9/L (ref 150–450)
POTASSIUM SERPL-SCNC: 4 MMOL/L (ref 3.4–5.3)
PROT SERPL-MCNC: 5.7 G/DL (ref 6.8–8.8)
RBC # BLD AUTO: 3.32 10E12/L (ref 4.4–5.9)
SODIUM SERPL-SCNC: 139 MMOL/L (ref 133–144)
WBC # BLD AUTO: 6.7 10E9/L (ref 4–11)

## 2020-09-19 PROCEDURE — 82550 ASSAY OF CK (CPK): CPT | Performed by: INTERNAL MEDICINE

## 2020-09-19 PROCEDURE — 85027 COMPLETE CBC AUTOMATED: CPT | Performed by: INTERNAL MEDICINE

## 2020-09-19 PROCEDURE — 99232 SBSQ HOSP IP/OBS MODERATE 35: CPT | Performed by: INTERNAL MEDICINE

## 2020-09-19 PROCEDURE — 25000128 H RX IP 250 OP 636: Performed by: INTERNAL MEDICINE

## 2020-09-19 PROCEDURE — 25000132 ZZH RX MED GY IP 250 OP 250 PS 637: Performed by: INTERNAL MEDICINE

## 2020-09-19 PROCEDURE — 80053 COMPREHEN METABOLIC PANEL: CPT | Performed by: INTERNAL MEDICINE

## 2020-09-19 PROCEDURE — 25000125 ZZHC RX 250: Performed by: INTERNAL MEDICINE

## 2020-09-19 PROCEDURE — 12000000 ZZH R&B MED SURG/OB

## 2020-09-19 PROCEDURE — 36415 COLL VENOUS BLD VENIPUNCTURE: CPT | Performed by: INTERNAL MEDICINE

## 2020-09-19 PROCEDURE — 25800030 ZZH RX IP 258 OP 636: Performed by: INTERNAL MEDICINE

## 2020-09-19 PROCEDURE — 84100 ASSAY OF PHOSPHORUS: CPT | Performed by: INTERNAL MEDICINE

## 2020-09-19 RX ORDER — TAMSULOSIN HYDROCHLORIDE 0.4 MG/1
0.4 CAPSULE ORAL DAILY
Status: DISCONTINUED | OUTPATIENT
Start: 2020-09-19 | End: 2020-09-25 | Stop reason: HOSPADM

## 2020-09-19 RX ORDER — LORAZEPAM 0.5 MG/1
0.5 TABLET ORAL EVERY 8 HOURS
Status: DISCONTINUED | OUTPATIENT
Start: 2020-09-19 | End: 2020-09-21

## 2020-09-19 RX ORDER — ACETAMINOPHEN 325 MG/1
650 TABLET ORAL EVERY 4 HOURS PRN
Status: DISCONTINUED | OUTPATIENT
Start: 2020-09-19 | End: 2020-09-25 | Stop reason: HOSPADM

## 2020-09-19 RX ORDER — ACETAMINOPHEN 650 MG/1
650 SUPPOSITORY RECTAL EVERY 4 HOURS PRN
Status: DISCONTINUED | OUTPATIENT
Start: 2020-09-19 | End: 2020-09-25 | Stop reason: HOSPADM

## 2020-09-19 RX ADMIN — POTASSIUM CHLORIDE AND SODIUM CHLORIDE: 900; 150 INJECTION, SOLUTION INTRAVENOUS at 08:01

## 2020-09-19 RX ADMIN — FOLIC ACID 1 MG: 1 TABLET ORAL at 08:10

## 2020-09-19 RX ADMIN — POTASSIUM CHLORIDE AND SODIUM CHLORIDE: 900; 150 INJECTION, SOLUTION INTRAVENOUS at 22:36

## 2020-09-19 RX ADMIN — THIAMINE HCL TAB 100 MG 200 MG: 100 TAB at 15:53

## 2020-09-19 RX ADMIN — METOPROLOL TARTRATE 50 MG: 50 TABLET, FILM COATED ORAL at 08:10

## 2020-09-19 RX ADMIN — GABAPENTIN 900 MG: 300 CAPSULE ORAL at 11:49

## 2020-09-19 RX ADMIN — POTASSIUM PHOSPHATE, MONOBASIC AND POTASSIUM PHOSPHATE, DIBASIC 15 MMOL: 224; 236 INJECTION, SOLUTION, CONCENTRATE INTRAVENOUS at 08:03

## 2020-09-19 RX ADMIN — TAMSULOSIN HYDROCHLORIDE 0.4 MG: 0.4 CAPSULE ORAL at 15:53

## 2020-09-19 RX ADMIN — MULTIPLE VITAMINS W/ MINERALS TAB 1 TABLET: TAB at 08:09

## 2020-09-19 RX ADMIN — POTASSIUM PHOSPHATE, MONOBASIC AND POTASSIUM PHOSPHATE, DIBASIC 20 MMOL: 224; 236 INJECTION, SOLUTION, CONCENTRATE INTRAVENOUS at 00:03

## 2020-09-19 RX ADMIN — THIAMINE HCL TAB 100 MG 200 MG: 100 TAB at 08:10

## 2020-09-19 RX ADMIN — POTASSIUM CHLORIDE AND SODIUM CHLORIDE: 900; 150 INJECTION, SOLUTION INTRAVENOUS at 06:25

## 2020-09-19 RX ADMIN — GABAPENTIN 900 MG: 300 CAPSULE ORAL at 19:49

## 2020-09-19 RX ADMIN — GABAPENTIN 900 MG: 300 CAPSULE ORAL at 04:02

## 2020-09-19 RX ADMIN — LORAZEPAM 0.5 MG: 0.5 TABLET ORAL at 11:52

## 2020-09-19 RX ADMIN — LORAZEPAM 0.5 MG: 0.5 TABLET ORAL at 19:50

## 2020-09-19 RX ADMIN — POTASSIUM CHLORIDE 40 MEQ: 1.5 POWDER, FOR SOLUTION ORAL at 00:23

## 2020-09-19 RX ADMIN — THIAMINE HCL TAB 100 MG 200 MG: 100 TAB at 21:12

## 2020-09-19 RX ADMIN — LORAZEPAM 1 MG: 1 TABLET ORAL at 08:17

## 2020-09-19 ASSESSMENT — ACTIVITIES OF DAILY LIVING (ADL)
ADLS_ACUITY_SCORE: 21

## 2020-09-19 NOTE — PLAN OF CARE
Ambulatory Status:  Pt up A2 with gait belt.  Patient very shaky and unsteady, alarms on for safety  VS:  vss  Pain:  denies pain  Resp: LS diminished on RA  GI:  denies nausea.  Regular diet.  BS active.  Passing flatus.  Last BM unknown.  :  voiding adequately  Tx:  Symptom management, Gabapentin  Labs:  Potassium 2.7 (replaced and recheck this AM)  Phosphorus 1.1 (replaced tonight, recheck this AM)  Consults:  SW  Disposition:  tbd  CIWA:  2, 6.    Patient very lethargic tonight, disoriented to place and time.

## 2020-09-19 NOTE — PLAN OF CARE
Patient CIWA scores ranged from 5-9 this shift. He received 1mg po PRN ativan and 0.5mg po sched ativan this shift. He ate all three meals 100%. Up with assist of 2 and gait belt. He is alert to self and knows he is in a hospital. He is calm and cooperative. Has a significant tremor. Reports double or blurred vision. Speech can be difficult to understand at times. Straight cath x1 this shift.

## 2020-09-19 NOTE — PLAN OF CARE
Mother Called. Patient gave permission to release information to mother. She stated that he had a long period free from ETOH for almost a decade. She does not know when he started back drinking. .

## 2020-09-19 NOTE — PLAN OF CARE
Patient voided 900cc at 10 am . Bladder scan at 1500 for 900cc+ and unable to void. Straight cath for 1000cc.

## 2020-09-19 NOTE — PLAN OF CARE
"Orientation: A/O x3- not to year  VS:/86 (BP Location: Left arm)   Pulse 99   Temp 98.2  F (36.8  C) (Axillary)   Resp 18   Ht 1.753 m (5' 9\")   SpO2 99%   BMI 24.81 kg/m    Tele: SR in the 90's  LS: clear bilat  GI: +BS, pt reports BM earlier today  : voiding w/o difficulty  Skin: bruised, scabs  Activity: unsteady assist of 1  Pain: 5/10 intermittent knee pain  Lines: 2 PIV, 1 infusing at 100ml/hr  Plan: ativan given x1, pt sleeping heavily. Potassium 2.7, replace per protocol. Phos 1.1, IV replacement ordered- oncoming RN aware.   Zoraida Graham RN on 9/18/2020 at 11:55 PM    "

## 2020-09-19 NOTE — H&P
[facility]  Hospitalist Admission Note  Name: Omega Guthrie    MRN: [id number]  YOB: 1970    Age: 50 year old  Date of admission: 9/18/2020  Primary care provider: Lyn Villela    Chief Complaint:  Alcohol withdrawal    Assessment and Plan:     Omega Guthrie is a 50 year old male with PMH including alcohol use disorder with prior alcohol withdrawal and hypertension who was admitted on 9/18/2020 directly from [facility] emergency room as there were no open medical beds with acute alcohol withdrawal.    1.  Alcohol use disorder with acute alcohol withdrawal: Patient had previously been sober, started drinking the past 6 months.  Presents initially with the desire for detox.  Transferred from [facility] ER for ongoing care.  Initially he was hypertensive and tachycardic.  He also was tremulous.  He does appear slightly confused.  He had a CT head which showed no acute pathology.  I suspect his confusion is related to alcohol use and withdrawal.  Start treatment for alcohol withdrawal.  -CIWA protocol as well as scheduled gabapentin  -Vitamins  -Social work consult    2.  Hyponatremia and hypokalemia: Likely hypovolemic.  Start IV fluids until he is able to maintain adequate p.o. intake.  Replacement of electrolytes via protocol.    3.  Acute alcoholic transaminitis: Transaminitis picture consistent with alcohol use.  Certainly needs alcohol cessation.  Repeat labs tomorrow.    4.  Hypertension: Patient reports that he takes metoprolol.  We will continue this but placed hold parameters.    Diet: Combination Diet Regular Diet Adult    DVT Prophylaxis: Pneumatic Compression Devices  Traylor Catheter: not present  Code Status: Full Code      Disposition Plan   Expected discharge: Admit to inpateint status, recommended to prior living arrangement once withdrawal resolved.  Entered: Liza Jordan MD 09/18/2020, 7:48 PM     The patient's care was discussed with the Bedside Nurse and Patient.    Liza  Kristin Jordan MD  [facility]    History of Present Illness:  Omega Guthrie is a 50 year old male with PMH including alcohol use disorder with prior alcohol withdrawal and hypertension who was admitted on 9/18/2020 directly from [facility] emergency room as there were no open medical beds with acute alcohol withdrawal.  History was obtained through patient interview, chart review and discussion with JORGE Woodruff who spoke with the ER physician at [facility].    The patient tells me that he presented to the hospital because he has been feeling progressively weaker and unsteady over the past week.  He also had nausea and vomiting.  He states that he had quit drinking and went through treatment and was sober for some time but then restarted drinking.  He states he restarted drinking in 1997.  Since his brother got  in early August he has been trying to cut back on his alcohol intake.  He has been cutting down the number of shots that he takes today.  He cannot tell me at the P, she was drinking but he cut back to 4 shots a day and then over the past few days cut back to 2 shots per day.  He last had 2 shots of hard alcohol last night.    He denies chest pain, shortness of breath, abdominal pain, diarrhea, constipation, fevers, chills.  He notes that he feels diffusely weak.  He has some nausea.   He has also had emesis.    He initially presented to [facility] ER seeking to detox from alcohol.  Per that note he had been sober for 6 years but started drinking again in the past 6 months.  The ER physician did speak with the patient's mother who reported that he was hallucinating yesterday.  He has history of alcohol withdrawal seizures and DTs.    The patient does tell me that he had blurry vision a few times.  He would blink his eyes and open them really wide.  He then tells me that this was from [place].     Past Medical History:  Past Medical History:   Diagnosis Date     Hypertension      Seizures (H)   "    Substance abuse (H) 2010    Rehab     Past Surgical History:  Past Surgical History:   Procedure Laterality Date     EYE SURGERY       Social History:  Social History     Tobacco Use     Smoking status: Never Smoker     Smokeless tobacco: Never Used   Substance Use Topics     Alcohol use: Yes     Social History     Social History Narrative     Not on file     Family History:  Reviewed and non contributory to the case.    Allergies:  Allergies   Allergen Reactions     Penicillins Other (See Comments)     Facial rash and pain     Medications:  Medications Prior to Admission   Medication Sig Dispense Refill Last Dose     metoprolol tartrate (LOPRESSOR) 50 MG tablet Take 50 mg by mouth 2 times daily        multivitamin w/minerals (MULTI-VITAMIN) tablet Take 1 tablet by mouth daily        Review of Systems:  A Comprehensive greater than 10 system review of systems was carried out.  Pertinent positives and negatives are noted above.  Otherwise negative for contributory information.     Physical Exam:  Blood pressure 105/86, pulse 99, temperature 98.2  F (36.8  C), temperature source Axillary, resp. rate 18, height 1.753 m (5' 9\"), SpO2 99 %.  Wt Readings from Last 1 Encounters:   09/18/20 76.2 kg (168 lb)     Exam:   General: Alert, no acute distress, slightly slow to respond to questioning, appears extremely weak  HEENT: NC/AT, eyes anicteric and without injection, EOMI, face symmetric.  Dentition WNL, MMM.  Cardiac: RRR, normal S1, S2.  No murmurs/g/r.  No LE edema  Pulmonary: Normal chest rise, normal work of breathing.  Lungs CTAB without crackles or wheezing  Abdomen: soft, non-tender, non-distended.  Normoactive BS.  No guarding or rebound tenderness.  Extremities: no deformities.  Warm, well perfused.  Skin: no rashes or lesions noted.  Warm and Dry.  Neuro: No focal deficits noted.  Speech slightly slurred but understandable.  Coordination and strength grossly normal.  Psych: Appropriate affect. Alert to self, " can provide details of presentation but timeline seems off, somewhat tangential    Data Reviewed Today:  EKG:  Sinus, prolonged QT  Imaging:  Results for orders placed or performed during the hospital encounter of 09/18/20   Head CT w/o contrast    Narrative    CT SCAN OF THE HEAD WITHOUT CONTRAST   9/18/2020 5:14 PM     HISTORY: Altered level of consciousness (LOC), altered mental status,  unexplained    TECHNIQUE:  Axial images of the head and coronal reformations without  IV contrast material. Radiation dose for this scan was reduced using  automated exposure control, adjustment of the mA and/or kV according  to patient size, or iterative reconstruction technique.    COMPARISON: None.    FINDINGS:  Mildly motion artifact. Mild volume loss is present. White matter  hypoattenuation likely represents mild chronic small vessel ischemic  change. The cerebral hemispheres, brainstem, and cerebellum otherwise  demonstrate normal morphology and attenuation. No evidence of acute  ischemia, hemorrhage, mass, mass effect or hydrocephalus. The  visualized calvarium, tympanic cavities, mastoid cavities, and  paranasal sinuses are unremarkable. Densities in the bilateral  external auditory canals, presumably cerumen.      Impression    IMPRESSION:   No acute intracranial abnormality.    LUCI PONCE MD     Labs:  Recent Labs   Lab 09/18/20  1254   WBC 9.0   HGB 13.3   HCT 35.3*   MCV 93        Recent Labs   Lab 09/18/20  1254   *   POTASSIUM 2.4*   CHLORIDE 90*   CO2 30   ANIONGAP 10   *   BUN 14   CR 0.59*   GFRESTIMATED >90   GFRESTBLACK >90   DENAE 8.9   MAG 2.2   PROTTOTAL 6.6*   ALBUMIN 3.3*   BILITOTAL 2.1*   ALKPHOS 117   *   ALT 85*       Liza Jordan MD  Hospitalist  [facility]

## 2020-09-19 NOTE — PROGRESS NOTES
[facility]  Hospitalist Progress Note  Liza Jordan MD 09/19/20  Text Page  Pager: [phone] (7am-6pm)    Reason for Stay (Diagnosis): Alcohol withdrawal         Assessment and Plan:      Summary of Stay: Omega Guthrie is a 50 year old male with PMH including alcohol use disorder with prior alcohol withdrawal and hypertension who was admitted on 9/18/2020 directly from [facility] emergency room as there were no open medical beds with acute alcohol withdrawal.    Problem List/Assessment and Plan:     1.  Alcohol use disorder with acute alcohol withdrawal and Acute Alcoholic Encephalopathy: Patient had previously been sober, started drinking heavily again (unclear exact timeline).  Patient's mother stated he had a period of sobriety for almost 10 years, she is unsure when he started drinking again.  Presents initially with the desire for detox.  Transferred from [facility] ER for ongoing care.  Initially he was hypertensive and tachycardic.  He also was tremulous.  He does appear slightly confused and is quite tremulous.  He had a CT head which showed no acute pathology.  I suspect his confusion is related to alcohol use and withdrawal. Will schedule Ativan in addition to CIWA.  -CIWA protocol as well as scheduled gabapentin, Ativan 0.5 mg Q8H  -Vitamins  -Social work consult     2.  Hyponatremia- Resolved: Likely hypovolemic, resolved.    3. Hypokalemia and Hypophosphatemia: Due to significant alcohol intake and poor diet.  Replace per protocol.     3.  Acute alcoholic transaminitis: Transaminitis picture consistent with alcohol use.  Certainly needs alcohol cessation.  Repeat labs tomorrow, improving slightly today.     4.  Hypertension: Continue PTA Metoprolol.    Diet: Combination Diet Regular Diet Adult    DVT Prophylaxis: Pneumatic Compression Devices  Traylor Catheter: not present  Code Status: Full Code      Disposition Plan   Expected discharge: 2 - 3 days, recommended to prior living arrangement  "once withdrawal resolved.  Entered: Liza Jordan MD 09/19/2020, 10:16 AM       The patient's care was discussed with the Bedside Nurse and Patient.    Hospitalist Service  [facility]          Interval History (Subjective):      Patient was discussed with his bedside nurse.  Remains quite week and needs assistance moving due to significant tremors.  He states he is feeling better than last night.  Feels very thirsty.  I asked when he started drinking again and he was quite tangential with this answer, giving me several different years in a matter of about 30 seconds.  He did eat breakfast.  Denies nausea, emesis, abdominal pain, CP or SOB.                  Physical Exam:      Last Vital Signs:  BP (!) 128/95 (BP Location: Left arm)   Pulse 102   Temp 98.2  F (36.8  C) (Oral)   Resp 20   Ht 1.753 m (5' 9\")   SpO2 97%   BMI 24.81 kg/m      General: Alert, awake, no acute distress, appears weak and deconditioned  HEENT: Normocephalic and atraumatic, eyes anicteric and without scleral injection, EOMI, face symmetric, dry MM.  Cardiac: RRR, normal S1, S2. No m/g/r, no LE edema.  Pulmonary: Normal chest rise, normal work of breathing.  Lungs CTAB without crackles or wheezing.  Abdomen: soft, non-tender, non-distended.  Normoactive bowel sounds, no guarding or rebound tenderness.  Extremities: no deformities.  Warm, well perfused.  Skin: no rashes or lesions.  Warm and Dry.  Neuro: No focal deficits.  Speech clear.  Tremulousness of outstretched hands.  Diffusely very weak.  Psych: Alert and oriented to person, aware he is in the hospital, not accurate with descriptions about recent events or drinking pattern. Appropriate affect.         Medications:      All current medications were reviewed with changes reflected in problem list.         Data:      All new lab and imaging data was reviewed.   Labs:  Recent Labs   Lab 09/19/20  0540 09/18/20  1254   WBC 6.7 9.0   HGB 11.3* 13.3   HCT 33.3* 35.3* "    93    256     Recent Labs   Lab 09/19/20  0540 09/18/20  2108 09/18/20  1254     --  130*   POTASSIUM 4.0 2.7* 2.4*   CHLORIDE 107  --  90*   CO2 28  --  30   ANIONGAP 4  --  10   GLC 94  --  111*   BUN 6*  --  14   CR 0.68  --  0.59*   GFRESTIMATED >90  --  >90   GFRESTBLACK >90  --  >90   DENAE 8.0*  --  8.9   MAG  --   --  2.2   PHOS 2.0* 1.1*  --    PROTTOTAL 5.7*  --  6.6*   ALBUMIN 2.8*  --  3.3*   BILITOTAL 1.5*  --  2.1*   ALKPHOS 98  --  117   *  --  158*   ALT 76*  --  85*      Imaging:   Recent Results (from the past 24 hour(s))   Head CT w/o contrast    Narrative    CT SCAN OF THE HEAD WITHOUT CONTRAST   9/18/2020 5:14 PM     HISTORY: Altered level of consciousness (LOC), altered mental status,  unexplained    TECHNIQUE:  Axial images of the head and coronal reformations without  IV contrast material. Radiation dose for this scan was reduced using  automated exposure control, adjustment of the mA and/or kV according  to patient size, or iterative reconstruction technique.    COMPARISON: None.    FINDINGS:  Mildly motion artifact. Mild volume loss is present. White matter  hypoattenuation likely represents mild chronic small vessel ischemic  change. The cerebral hemispheres, brainstem, and cerebellum otherwise  demonstrate normal morphology and attenuation. No evidence of acute  ischemia, hemorrhage, mass, mass effect or hydrocephalus. The  visualized calvarium, tympanic cavities, mastoid cavities, and  paranasal sinuses are unremarkable. Densities in the bilateral  external auditory canals, presumably cerumen.      Impression    IMPRESSION:   No acute intracranial abnormality.    MD Liza DANG MD

## 2020-09-20 LAB
ALBUMIN SERPL-MCNC: 2.4 G/DL (ref 3.4–5)
ALP SERPL-CCNC: 108 U/L (ref 40–150)
ALT SERPL W P-5'-P-CCNC: 79 U/L (ref 0–70)
ANION GAP SERPL CALCULATED.3IONS-SCNC: 1 MMOL/L (ref 3–14)
AST SERPL W P-5'-P-CCNC: 108 U/L (ref 0–45)
BILIRUB SERPL-MCNC: 1.1 MG/DL (ref 0.2–1.3)
BUN SERPL-MCNC: 6 MG/DL (ref 7–30)
CALCIUM SERPL-MCNC: 8.1 MG/DL (ref 8.5–10.1)
CHLORIDE SERPL-SCNC: 110 MMOL/L (ref 94–109)
CO2 SERPL-SCNC: 30 MMOL/L (ref 20–32)
CREAT SERPL-MCNC: 0.78 MG/DL (ref 0.66–1.25)
ERYTHROCYTE [DISTWIDTH] IN BLOOD BY AUTOMATED COUNT: 12.8 % (ref 10–15)
GFR SERPL CREATININE-BSD FRML MDRD: >90 ML/MIN/{1.73_M2}
GLUCOSE SERPL-MCNC: 102 MG/DL (ref 70–99)
HCT VFR BLD AUTO: 31.5 % (ref 40–53)
HGB BLD-MCNC: 10.5 G/DL (ref 13.3–17.7)
MAGNESIUM SERPL-MCNC: 2.1 MG/DL (ref 1.6–2.3)
MCH RBC QN AUTO: 34.5 PG (ref 26.5–33)
MCHC RBC AUTO-ENTMCNC: 33.3 G/DL (ref 31.5–36.5)
MCV RBC AUTO: 104 FL (ref 78–100)
PHOSPHATE SERPL-MCNC: 2.2 MG/DL (ref 2.5–4.5)
PLATELET # BLD AUTO: 240 10E9/L (ref 150–450)
POTASSIUM SERPL-SCNC: 4.7 MMOL/L (ref 3.4–5.3)
PROT SERPL-MCNC: 5.1 G/DL (ref 6.8–8.8)
RBC # BLD AUTO: 3.04 10E12/L (ref 4.4–5.9)
SODIUM SERPL-SCNC: 141 MMOL/L (ref 133–144)
WBC # BLD AUTO: 9 10E9/L (ref 4–11)

## 2020-09-20 PROCEDURE — 25000132 ZZH RX MED GY IP 250 OP 250 PS 637: Performed by: INTERNAL MEDICINE

## 2020-09-20 PROCEDURE — 99232 SBSQ HOSP IP/OBS MODERATE 35: CPT | Performed by: INTERNAL MEDICINE

## 2020-09-20 PROCEDURE — 25000125 ZZHC RX 250: Performed by: INTERNAL MEDICINE

## 2020-09-20 PROCEDURE — 85027 COMPLETE CBC AUTOMATED: CPT | Performed by: INTERNAL MEDICINE

## 2020-09-20 PROCEDURE — 36415 COLL VENOUS BLD VENIPUNCTURE: CPT | Performed by: INTERNAL MEDICINE

## 2020-09-20 PROCEDURE — 84100 ASSAY OF PHOSPHORUS: CPT | Performed by: INTERNAL MEDICINE

## 2020-09-20 PROCEDURE — 25000128 H RX IP 250 OP 636: Performed by: INTERNAL MEDICINE

## 2020-09-20 PROCEDURE — 83735 ASSAY OF MAGNESIUM: CPT | Performed by: INTERNAL MEDICINE

## 2020-09-20 PROCEDURE — 12000000 ZZH R&B MED SURG/OB

## 2020-09-20 PROCEDURE — 25800030 ZZH RX IP 258 OP 636: Performed by: INTERNAL MEDICINE

## 2020-09-20 PROCEDURE — 80053 COMPREHEN METABOLIC PANEL: CPT | Performed by: INTERNAL MEDICINE

## 2020-09-20 RX ORDER — CHOLECALCIFEROL (VITAMIN D3) 50 MCG
1 TABLET ORAL DAILY
COMMUNITY

## 2020-09-20 RX ADMIN — POTASSIUM PHOSPHATE, MONOBASIC AND POTASSIUM PHOSPHATE, DIBASIC 15 MMOL: 224; 236 INJECTION, SOLUTION, CONCENTRATE INTRAVENOUS at 08:26

## 2020-09-20 RX ADMIN — LORAZEPAM 1 MG: 1 TABLET ORAL at 20:00

## 2020-09-20 RX ADMIN — FOLIC ACID 1 MG: 1 TABLET ORAL at 08:26

## 2020-09-20 RX ADMIN — LORAZEPAM 0.5 MG: 0.5 TABLET ORAL at 04:23

## 2020-09-20 RX ADMIN — GABAPENTIN 900 MG: 300 CAPSULE ORAL at 19:09

## 2020-09-20 RX ADMIN — GABAPENTIN 900 MG: 300 CAPSULE ORAL at 12:36

## 2020-09-20 RX ADMIN — POTASSIUM CHLORIDE AND SODIUM CHLORIDE: 900; 150 INJECTION, SOLUTION INTRAVENOUS at 13:58

## 2020-09-20 RX ADMIN — LORAZEPAM 0.5 MG: 0.5 TABLET ORAL at 12:36

## 2020-09-20 RX ADMIN — METOPROLOL TARTRATE 50 MG: 50 TABLET, FILM COATED ORAL at 08:27

## 2020-09-20 RX ADMIN — LORAZEPAM 0.5 MG: 0.5 TABLET ORAL at 19:09

## 2020-09-20 RX ADMIN — THIAMINE HCL TAB 100 MG 200 MG: 100 TAB at 15:59

## 2020-09-20 RX ADMIN — TAMSULOSIN HYDROCHLORIDE 0.4 MG: 0.4 CAPSULE ORAL at 08:26

## 2020-09-20 RX ADMIN — THIAMINE HCL TAB 100 MG 200 MG: 100 TAB at 08:26

## 2020-09-20 RX ADMIN — MULTIPLE VITAMINS W/ MINERALS TAB 1 TABLET: TAB at 08:26

## 2020-09-20 RX ADMIN — GABAPENTIN 900 MG: 300 CAPSULE ORAL at 04:23

## 2020-09-20 ASSESSMENT — ACTIVITIES OF DAILY LIVING (ADL)
ADLS_ACUITY_SCORE: 21

## 2020-09-20 NOTE — PROGRESS NOTES
[facility]  Hospitalist Progress Note  Liza Jordan MD 09/20/20   Text Page  Pager: [phone] (7am-6pm)    Reason for Stay (Diagnosis): Alcohol withdrawal         Assessment and Plan:      Summary of Stay: Omega Guthrie is a 50 year old male with PMH including alcohol use disorder with prior alcohol withdrawal and hypertension who was admitted on 9/18/2020 directly from [facility] emergency room as there were no open medical beds with acute alcohol withdrawal.  Slowly improving.    Problem List/Assessment and Plan:     1.  Alcohol use disorder with acute alcohol withdrawal and Acute Alcoholic Encephalopathy: Patient had previously been sober, started drinking heavily again (unclear exact timeline).  Patient's mother stated he had a period of sobriety for almost 10 years, she is unsure when he started drinking again.  Presents initially with the desire for detox.  Transferred from [facility] ER for ongoing care.  Symptoms of alcohol withdrawal including significant tremulousness, HTN and tachycardia. He was also confused.  He had a CT head which showed no acute pathology.  I suspect his confusion is related to alcohol use and withdrawal.  He has scheduled Ativan in addition to CIWA.  Today appears slightly less confused and overall less tremulous.  -CIWA protocol as well as scheduled gabapentin, Ativan 0.5 mg Q8H  -Vitamins  -Social work consult     2.  Hyponatremia - Resolved: Likely hypovolemic, resolved.    3. Hypokalemia and Hypophosphatemia: Due to significant alcohol intake and poor diet.  Replace per protocol.     3.  Acute alcoholic transaminitis: Transaminitis picture consistent with alcohol use.  Certainly needs alcohol cessation.  Repeat labs tomorrow, improving slightly today.     4.  Hypertension: Continue PTA Metoprolol.    5.  Urinary Retention: Patient has urinary retention.  He has required straight catheterization x2. Started on Flomax.  If requires repeat catheterization will place  "pagan catheter.    Diet: Combination Diet Regular Diet Adult    DVT Prophylaxis: Pneumatic Compression Devices  Pagan Catheter: not present  Code Status: Full Code      Disposition Plan   Expected discharge: 2 - 3 days, recommended to prior living arrangement once withdrawal resolved.  Entered: Liza Jordan MD 09/20/2020, 10:24 AM       The patient's care was discussed with the Bedside Nurse and Patient.    Hospitalist Service  [facility]          Interval History (Subjective):      Patient was discussed with his bedside nurse.  Overall he states he is feeling better than yesterday.  No nausea, emesis, abdominal pain, CP or SOB.  He is aware he is in the hospital, could tell me it is [place], but not the exact hospital or city.  Thought it was 2020 but knew it was September.                    Physical Exam:      Last Vital Signs:  BP (!) 131/97 (BP Location: Right arm)   Pulse 91   Temp 97.8  F (36.6  C) (Oral)   Resp 18   Ht 1.753 m (5' 9\")   SpO2 99%   BMI 24.81 kg/m      General: Alert, awake, no acute distress, appears weak and deconditioned  HEENT: Normocephalic and atraumatic, eyes anicteric and without scleral injection, EOMI, face symmetric, dry MM.  Cardiac: RRR, normal S1, S2. No m/g/r, no LE edema.  Pulmonary: Normal chest rise, normal work of breathing.  Lungs CTAB without crackles or wheezing.  Abdomen: soft, non-tender, non-distended.  Normoactive bowel sounds, no guarding or rebound tenderness.  Extremities: no deformities.  Warm, well perfused.  Skin: no rashes or lesions.  Warm and Dry.  Neuro: No focal deficits.  Speech clear.  Improved tremor.  Diffusely very weak.  Psych: Alert and oriented to person, aware he is in the hospital, not accurate with descriptions about recent events or drinking pattern but overall able to answer questions better today. Appropriate affect.         Medications:      All current medications were reviewed with changes reflected in problem " list.         Data:      All new lab and imaging data was reviewed.   Labs:  Recent Labs   Lab 09/20/20  0605 09/19/20  0540 09/18/20  1254   WBC 9.0 6.7 9.0   HGB 10.5* 11.3* 13.3   HCT 31.5* 33.3* 35.3*   * 100 93    226 256     Recent Labs   Lab 09/20/20  0605 09/19/20  0540 09/18/20  2108 09/18/20  1254    139  --  130*   POTASSIUM 4.7 4.0 2.7* 2.4*   CHLORIDE 110* 107  --  90*   CO2 30 28  --  30   ANIONGAP 1* 4  --  10   * 94  --  111*   BUN 6* 6*  --  14   CR 0.78 0.68  --  0.59*   GFRESTIMATED >90 >90  --  >90   GFRESTBLACK >90 >90  --  >90   DENAE 8.1* 8.0*  --  8.9   MAG 2.1  --   --  2.2   PHOS 2.2* 2.0* 1.1*  --    PROTTOTAL 5.1* 5.7*  --  6.6*   ALBUMIN 2.4* 2.8*  --  3.3*   BILITOTAL 1.1 1.5*  --  2.1*   ALKPHOS 108 98  --  117   * 121*  --  158*   ALT 79* 76*  --  85*      Imaging:   No results found for this or any previous visit (from the past 24 hour(s)).    Liza Jordan MD

## 2020-09-20 NOTE — PLAN OF CARE
VS stable. Confused. Alert to person and place. CIWA scores of 6, 5, and 6.  No complaints of pain. Tele is sinus tach. Slept well throughout the night.

## 2020-09-20 NOTE — PROGRESS NOTES
CTS    Patient's demographics show no insurance. CM contacted Financial Counselor's office to request follow up with patient re: insurance.     Albania Magana RN BSN PHN CCM   Inpatient Care Coordination   [facility]   [phone]

## 2020-09-20 NOTE — PLAN OF CARE
VSS on RA. Disoriented to time. CIWA 5, 4. Tele: SR. Phos replaced per protocol recheck ordered  for am. IVF@100mL/hr infusing. Tolerating regular diet. Pt unable to void, Traylor replaced for retention. Assist x1 with gait belt. Will continue to monitor and POC.

## 2020-09-20 NOTE — PHARMACY-ADMISSION MEDICATION HISTORY
Admission medication history interview status for this patient is complete. See [facility] admission navigator for allergy information, prior to admission medications and immunization status.     Medication history interview done via telephone during Covid-19 pandemic, indicate source(s): Family (mother)  Medication history resources (including written lists, pill bottles, clinic record): Mother read off pt's pill bottles, SureScripts fill history, chart notes/Care Everywhere  Pharmacy: [facility] [place] (unable to confirm preferred discharge pharmacy with patient)     Changes made to PTA medication list:  Added: vitamin D   Deleted: N/A  Changed: N/A    Actions taken by pharmacist (provider contacted, etc):None     Additional medication history information: Per pt's mother, patient was adherent to metoprolol until the most recent fill (8/8/20 #30ds) ran out. Patient has previously used amlodipine (on Care Everywhere), but mother confirmed that patient is no longer taking.     Patient not appropriate for interview - medication history completed to best of pharmacy's ability at this time.     Medication reconciliation/reorder completed by provider prior to medication history?  Y     For patients on insulin therapy: N      Prior to Admission medications    Medication Sig Last Dose Taking? Auth Provider   metoprolol tartrate (LOPRESSOR) 50 MG tablet Take 50 mg by mouth 2 times daily Past Month at Unknown time Yes Reported, Patient   multivitamin w/minerals (MULTI-VITAMIN) tablet Take 1 tablet by mouth daily Past Week at Unknown time Yes Reported, Patient   vitamin D3 (CHOLECALCIFEROL) 50 mcg (2000 units) tablet Take 1 tablet by mouth daily Past Week at Unknown time Yes Unknown, Entered By History       Keturah Orellana  PGY-1 Pharmacy Practice Resident

## 2020-09-20 NOTE — CONSULTS
9/20/20 chem dep consult acknowledged.  Per EHR, patient has no listed insurance and would need Rule 25 funding from [facility].  I emailed unit , Corinne W, the [facility] application paperwork and requested she provide to patient to complete prior to being seen by chem dep.  Will follow up once request is completed.

## 2020-09-21 LAB — PHOSPHATE SERPL-MCNC: 3.5 MG/DL (ref 2.5–4.5)

## 2020-09-21 PROCEDURE — 36415 COLL VENOUS BLD VENIPUNCTURE: CPT | Performed by: INTERNAL MEDICINE

## 2020-09-21 PROCEDURE — 25000132 ZZH RX MED GY IP 250 OP 250 PS 637: Performed by: INTERNAL MEDICINE

## 2020-09-21 PROCEDURE — 25000128 H RX IP 250 OP 636: Performed by: INTERNAL MEDICINE

## 2020-09-21 PROCEDURE — 12000000 ZZH R&B MED SURG/OB

## 2020-09-21 PROCEDURE — 40000007 ZZH STATISTIC ADULT CD FACE TO FACE-NO CHRG

## 2020-09-21 PROCEDURE — 84100 ASSAY OF PHOSPHORUS: CPT | Performed by: INTERNAL MEDICINE

## 2020-09-21 PROCEDURE — 99232 SBSQ HOSP IP/OBS MODERATE 35: CPT | Performed by: INTERNAL MEDICINE

## 2020-09-21 RX ORDER — GABAPENTIN 100 MG/1
100 CAPSULE ORAL 3 TIMES DAILY
Status: COMPLETED | OUTPATIENT
Start: 2020-09-23 | End: 2020-09-24

## 2020-09-21 RX ORDER — GABAPENTIN 300 MG/1
300 CAPSULE ORAL 3 TIMES DAILY
Status: COMPLETED | OUTPATIENT
Start: 2020-09-21 | End: 2020-09-22

## 2020-09-21 RX ADMIN — THIAMINE HCL TAB 100 MG 100 MG: 100 TAB at 08:21

## 2020-09-21 RX ADMIN — METOPROLOL TARTRATE 50 MG: 50 TABLET, FILM COATED ORAL at 21:27

## 2020-09-21 RX ADMIN — FOLIC ACID 1 MG: 1 TABLET ORAL at 08:21

## 2020-09-21 RX ADMIN — THIAMINE HCL TAB 100 MG 100 MG: 100 TAB at 21:27

## 2020-09-21 RX ADMIN — GABAPENTIN 300 MG: 300 CAPSULE ORAL at 15:45

## 2020-09-21 RX ADMIN — GABAPENTIN 900 MG: 300 CAPSULE ORAL at 06:19

## 2020-09-21 RX ADMIN — TAMSULOSIN HYDROCHLORIDE 0.4 MG: 0.4 CAPSULE ORAL at 08:21

## 2020-09-21 RX ADMIN — THIAMINE HCL TAB 100 MG 100 MG: 100 TAB at 15:45

## 2020-09-21 RX ADMIN — LORAZEPAM 0.5 MG: 0.5 TABLET ORAL at 06:19

## 2020-09-21 RX ADMIN — MULTIPLE VITAMINS W/ MINERALS TAB 1 TABLET: TAB at 08:20

## 2020-09-21 RX ADMIN — POTASSIUM CHLORIDE AND SODIUM CHLORIDE: 900; 150 INJECTION, SOLUTION INTRAVENOUS at 10:13

## 2020-09-21 RX ADMIN — GABAPENTIN 300 MG: 300 CAPSULE ORAL at 21:27

## 2020-09-21 RX ADMIN — POTASSIUM CHLORIDE AND SODIUM CHLORIDE: 900; 150 INJECTION, SOLUTION INTRAVENOUS at 00:37

## 2020-09-21 ASSESSMENT — ACTIVITIES OF DAILY LIVING (ADL)
ADLS_ACUITY_SCORE: 21
ADLS_ACUITY_SCORE: 20
ADLS_ACUITY_SCORE: 21

## 2020-09-21 NOTE — PROGRESS NOTES
[facility]  Hospitalist Progress Note  Liza Jordan MD 09/21/20   Text Page  Pager: [phone] (7am-6pm)    Reason for Stay (Diagnosis): Alcohol withdrawal         Assessment and Plan:      Summary of Stay: Omega Guthrie is a 50 year old male with PMH including alcohol use disorder with prior alcohol withdrawal and hypertension who was admitted on 9/18/2020 directly from [facility] emergency room as there were no open medical beds with acute alcohol withdrawal.  Slowly improving although remains confused.    Problem List/Assessment and Plan:     1.  Alcohol use disorder with acute alcohol withdrawal and Acute Alcoholic Encephalopathy: Patient had previously been sober, started drinking heavily again (unclear exact timeline).  Patient's mother stated he had a period of sobriety for almost 10 years, she is unsure when he started drinking again.  Presents initially with the desire for detox.  Transferred from [facility] ER for ongoing care.  Symptoms of alcohol withdrawal included significant tremulousness, HTN and tachycardia. He was also confused.  He had a CT head which showed no acute pathology.  I suspect his confusion is related to alcohol use and withdrawal, overall orientation improving but still not back to baseline.  Initially on scheduled Ativan for severe withdrawal symptoms but stop this now as he is improving and somewhat sleepy.    -CIWA protocol as well as scheduled gabapentin  -Vitamins  -Social work consult     2.  Hyponatremia - Resolved: Likely hypovolemic, resolved.    3. Hypokalemia and Hypophosphatemia: Due to significant alcohol intake and poor diet.  Replace per protocol.     3.  Acute alcoholic transaminitis: Transaminitis picture consistent with alcohol use.  Certainly needs alcohol cessation.  Repeat labs tomorrow..     4.  Hypertension: Continue PTA Metoprolol.    5.  Urinary Retention: Patient has urinary retention.  He has required straight catheterization multiple times.  "Started on Flomax.  Traylor placed on 9/20 for persistent retention.    Diet: Combination Diet Regular Diet Adult    DVT Prophylaxis: Pneumatic Compression Devices  Traylor Catheter: in place, indication: Retention  Code Status: Full Code      Disposition Plan   Expected discharge: 2 - 3 days, recommended to prior living arrangement once withdrawal resolved.  Entered: Liza Jordan MD 09/21/2020, 10:13 AM       The patient's care was discussed with the Bedside Nurse and Patient.    Hospitalist Service  [facility]          Interval History (Subjective):      Patient was seen with his bedside nurse this morning.  He has been quite sleepy over the past 12 hours.  He was sleeping when I saw him but was easily awoken.  He tells me that he is feeling tired today.  Denies pain, particularly chest pain or abdominal pain.  No nausea or emesis.  Did eat breakfast. He is able to tell me he is in the hospital, unsure about which hospital.  Knew that he was in [place], thought it was 2019 and August.  Stated he came to the hospital because he was dizzy, not eating well and had been drinking too much.                  Physical Exam:      Last Vital Signs:  /72 (BP Location: Left arm)   Pulse 115   Temp 97.8  F (36.6  C) (Oral)   Resp 18   Ht 1.753 m (5' 9\")   SpO2 97%   BMI 24.81 kg/m      General: Sleeping but easily awoken, no acute distress, appears weak and deconditioned  HEENT: Normocephalic and atraumatic, eyes anicteric and without scleral injection, EOMI, face symmetric, dry MM.  Cardiac: RRR, normal S1, S2. No m/g/r, no LE edema.  Pulmonary: Normal chest rise, normal work of breathing.  Lungs CTAB without crackles or wheezing.  Abdomen: soft, non-tender, non-distended.  Normoactive bowel sounds, no guarding or rebound tenderness.  Extremities: no deformities.  Warm, well perfused.  Skin: Significant bruising on left anterior chest.  Warm and Dry.  Neuro: No focal deficits.  Speech slow but clear.  No " tremor.  Diffusely very weak.  Psych: Alert and oriented to person, aware he is in the hospital, thought it was August 2019 but accurate response for why he is hospitalized. Appropriate affect.         Medications:      All current medications were reviewed with changes reflected in problem list.         Data:      All new lab and imaging data was reviewed.   Labs:  Recent Labs   Lab 09/20/20  0605 09/19/20  0540 09/18/20  1254   WBC 9.0 6.7 9.0   HGB 10.5* 11.3* 13.3   HCT 31.5* 33.3* 35.3*   * 100 93    226 256     Recent Labs   Lab 09/21/20  0613 09/20/20  0605 09/19/20  0540 09/18/20  2108  09/18/20  1254   NA  --  141 139  --   --  130*   POTASSIUM  --  4.7 4.0 2.7*  --  2.4*   CHLORIDE  --  110* 107  --   --  90*   CO2  --  30 28  --   --  30   ANIONGAP  --  1* 4  --   --  10   GLC  --  102* 94  --   --  111*   BUN  --  6* 6*  --   --  14   CR  --  0.78 0.68  --   --  0.59*   GFRESTIMATED  --  >90 >90  --   --  >90   GFRESTBLACK  --  >90 >90  --   --  >90   DENAE  --  8.1* 8.0*  --   --  8.9   MAG  --  2.1  --   --   --  2.2   PHOS 3.5 2.2* 2.0* 1.1*   < >  --    PROTTOTAL  --  5.1* 5.7*  --   --  6.6*   ALBUMIN  --  2.4* 2.8*  --   --  3.3*   BILITOTAL  --  1.1 1.5*  --   --  2.1*   ALKPHOS  --  108 98  --   --  117   AST  --  108* 121*  --   --  158*   ALT  --  79* 76*  --   --  85*    < > = values in this interval not displayed.      Imaging:   No results found for this or any previous visit (from the past 24 hour(s)).    Liza Jordan MD

## 2020-09-21 NOTE — PLAN OF CARE
Patient continues on CIWA protocol with scores of 4-9, total of 1mg Ativan prn, up with A-1  with GB alert but confused, Traylor patent with good output, CD/SW following, IVF@100ml/hr,hs medication held with patient somnolent, will continue with POC.

## 2020-09-21 NOTE — CONSULTS
"Discharge Planner   Discharge Plans in progress: Tereso met with the pt to discuss CD resources.  The pt was laying in bed watching tv when sw arrived.  The pt was in a pleasant mood and contributed appropriately to the conversation.  He said that he currently drinks about 6 shots/day.  He said he wakes up with an \"urging to drink, but gets control of that.\"  He said that he has to tell himself he can only have 1 drink.  He tries to keep busy and focus on things that need to be done to \"play interference to drinking.\"  He works at [employer] between 20-25 hours/week.  He lives alone, but his parents are visiting and staying with him now.  He has a supportive family.  He said that there is alcohol in his home right now, but it belongs to his parents.  He said that he will have them put it somewhere he doesn't know where it is.     The pt said that he was in IP CD treatment in 1990 or 1990 at [facility].  He is currently not in treatment and has not been in treatment since [facility].  He said that he is not interested in formal treatment at this time.  He did accept the CD resource brochure from tereso.  Tereso told him to follow up with [facility] for a Rule 25 if he decides he wants to go to IP treatment.    The pt had no additional questions or concerns at this time.    Tereso emailed [facility] to update them on the pt's lack of interest in treatment.     His family will provide transport home.     09/21/20 1646   Final Resources   Other Resources [facility]   [facility] Brochure given to patient       Barriers to discharge plan: None identified at this time.  Follow up plan: Sw will continue to be available as needed until discharge.       Entered by: Nayeli Russell 09/21/2020 4:47 PM     MICHAEL Petty, [facility]  Inpatient Care Coordination  [facility]  [phone]  "

## 2020-09-21 NOTE — CONSULTS
9/21/20 chem dep acknowledged and closed.  Received follow up from , Nayeli MENDOZA, that patient is not interested in seeking substance use services.  She provided him resources and directed to [facility] for any future needs.

## 2020-09-21 NOTE — PLAN OF CARE
DANIELA orientation, pt slept all of shift, arouses to voice then falls right back to sleep, unable to give oral meds, unable to stay awake long enough for oral intake, assist x 1 w/gb, regular diet, fluids infusing, tele SR, CIWA scores 2 & 2, pagan in place w/good output, plan is for discharge 2-3 days, will continue with POC.    Addendum: Pt woke up around 6:15 am, meds given, CIWA score 5, also ordered breakfast

## 2020-09-21 NOTE — PLAN OF CARE
Vss, no co pain/cp/sob.   Tele ST.  SZ precautions maintained, up with Ax1+gb+walker, ambulated in halls with staff, alarms on for safety.  IVF dc'd, CIWA 2, 2.  Traylor in place for retention, Flomax given, +BM this shift.  Pt is forgetful and slow to respond but can still answer most orientation questions when he is alert, he has been lethargic today. Up to chair for lunch this shift, not yet ready for dc. Continue poc and monitoring.

## 2020-09-22 LAB
ALBUMIN SERPL-MCNC: 2.5 G/DL (ref 3.4–5)
ALP SERPL-CCNC: 140 U/L (ref 40–150)
ALT SERPL W P-5'-P-CCNC: 88 U/L (ref 0–70)
ANION GAP SERPL CALCULATED.3IONS-SCNC: 6 MMOL/L (ref 3–14)
AST SERPL W P-5'-P-CCNC: 84 U/L (ref 0–45)
BILIRUB SERPL-MCNC: 1.1 MG/DL (ref 0.2–1.3)
BUN SERPL-MCNC: 6 MG/DL (ref 7–30)
CALCIUM SERPL-MCNC: 8.6 MG/DL (ref 8.5–10.1)
CHLORIDE SERPL-SCNC: 103 MMOL/L (ref 94–109)
CO2 SERPL-SCNC: 27 MMOL/L (ref 20–32)
CREAT SERPL-MCNC: 0.68 MG/DL (ref 0.66–1.25)
ERYTHROCYTE [DISTWIDTH] IN BLOOD BY AUTOMATED COUNT: 12.6 % (ref 10–15)
GFR SERPL CREATININE-BSD FRML MDRD: >90 ML/MIN/{1.73_M2}
GLUCOSE SERPL-MCNC: 91 MG/DL (ref 70–99)
HCT VFR BLD AUTO: 34 % (ref 40–53)
HGB BLD-MCNC: 11.4 G/DL (ref 13.3–17.7)
MCH RBC QN AUTO: 34 PG (ref 26.5–33)
MCHC RBC AUTO-ENTMCNC: 33.5 G/DL (ref 31.5–36.5)
MCV RBC AUTO: 102 FL (ref 78–100)
PLATELET # BLD AUTO: 335 10E9/L (ref 150–450)
POTASSIUM SERPL-SCNC: 3.7 MMOL/L (ref 3.4–5.3)
PROT SERPL-MCNC: 5.6 G/DL (ref 6.8–8.8)
RBC # BLD AUTO: 3.35 10E12/L (ref 4.4–5.9)
SODIUM SERPL-SCNC: 136 MMOL/L (ref 133–144)
WBC # BLD AUTO: 9.3 10E9/L (ref 4–11)

## 2020-09-22 PROCEDURE — 99232 SBSQ HOSP IP/OBS MODERATE 35: CPT | Performed by: INTERNAL MEDICINE

## 2020-09-22 PROCEDURE — 80053 COMPREHEN METABOLIC PANEL: CPT | Performed by: INTERNAL MEDICINE

## 2020-09-22 PROCEDURE — 12000000 ZZH R&B MED SURG/OB

## 2020-09-22 PROCEDURE — 25000132 ZZH RX MED GY IP 250 OP 250 PS 637: Performed by: INTERNAL MEDICINE

## 2020-09-22 PROCEDURE — 85027 COMPLETE CBC AUTOMATED: CPT | Performed by: INTERNAL MEDICINE

## 2020-09-22 PROCEDURE — 36415 COLL VENOUS BLD VENIPUNCTURE: CPT | Performed by: INTERNAL MEDICINE

## 2020-09-22 RX ADMIN — METOPROLOL TARTRATE 50 MG: 50 TABLET, FILM COATED ORAL at 09:57

## 2020-09-22 RX ADMIN — THIAMINE HCL TAB 100 MG 100 MG: 100 TAB at 17:35

## 2020-09-22 RX ADMIN — GABAPENTIN 300 MG: 300 CAPSULE ORAL at 21:22

## 2020-09-22 RX ADMIN — FOLIC ACID 1 MG: 1 TABLET ORAL at 09:57

## 2020-09-22 RX ADMIN — THIAMINE HCL TAB 100 MG 100 MG: 100 TAB at 21:21

## 2020-09-22 RX ADMIN — GABAPENTIN 300 MG: 300 CAPSULE ORAL at 17:34

## 2020-09-22 RX ADMIN — MULTIPLE VITAMINS W/ MINERALS TAB 1 TABLET: TAB at 09:57

## 2020-09-22 RX ADMIN — METOPROLOL TARTRATE 50 MG: 50 TABLET, FILM COATED ORAL at 21:21

## 2020-09-22 RX ADMIN — GABAPENTIN 300 MG: 300 CAPSULE ORAL at 09:57

## 2020-09-22 RX ADMIN — THIAMINE HCL TAB 100 MG 100 MG: 100 TAB at 09:57

## 2020-09-22 RX ADMIN — LORAZEPAM 1 MG: 1 TABLET ORAL at 00:19

## 2020-09-22 RX ADMIN — LORAZEPAM 1 MG: 1 TABLET ORAL at 02:37

## 2020-09-22 RX ADMIN — TAMSULOSIN HYDROCHLORIDE 0.4 MG: 0.4 CAPSULE ORAL at 09:57

## 2020-09-22 ASSESSMENT — ACTIVITIES OF DAILY LIVING (ADL)
ADLS_ACUITY_SCORE: 21
ADLS_ACUITY_SCORE: 21
ADLS_ACUITY_SCORE: 22
ADLS_ACUITY_SCORE: 21

## 2020-09-22 NOTE — PLAN OF CARE
"Presentation/Diagnosis: acute alcohol withdrawal/encephalopathy  Labs/Protocols: CIWA max score of 10 requiring ativan, most recent score of 5.  Vitals: BP (!) 141/98 (BP Location: Right arm)   Pulse 103   Temp 98.3  F (36.8  C) (Oral)   Resp 16   Ht 1.753 m (5' 9\")   SpO2 97%   BMI 24.81 kg/m   pt on RA, denies any pain.  Cardiac: WNL, tachy at times.  Telemetry: SR  Respiratory: dry cough noted, LS clear. Denied any shortness of breath.  Neuro: pt confused. Disoriented to time, place, situation.  GI/: pt has pagan in place for retention; draining appropriately. BS normoactive.   Skin: Bruise to sternum noted, scabbing to bilateral knees.  LDAs: piv saline locked  Plan: continue with poc.     "

## 2020-09-22 NOTE — PROGRESS NOTES
[facility]  Hospitalist Progress Note  Liza Jordan MD 09/22/20   Text Page  Pager: [phone] (7am-6pm)    Reason for Stay (Diagnosis): Alcohol withdrawal         Assessment and Plan:      Summary of Stay: Omega Guthrie is a 50 year old male with PMH including alcohol use disorder with prior alcohol withdrawal and hypertension who was admitted on 9/18/2020 directly from [facility] emergency room as there were no open medical beds with acute alcohol withdrawal.  Slowly improving although remains confused. Is having intermittent visual hallucinations.    Problem List/Assessment and Plan:     1.  Alcohol use disorder with acute alcohol withdrawal and Acute Alcoholic Encephalopathy and Hallucinations: Patient had previously been sober, started drinking heavily again (unclear exact timeline).  Family noticed that he was behaving like he was drinking and developing withdrawal, parents came down from [facility] and brought him to the hospital for acute withdrawal (at that time was confused, hallucinating).  Transferred from [facility] ER for ongoing care due to lack of beds.  Symptoms of alcohol withdrawal included significant tremulousness, HTN and tachycardia. He was also confused with intermittent hallucinations.  He had a CT head which showed no acute pathology.  I suspect his confusion is related to alcohol use and withdrawal, overall orientation improving but still not back to baseline.  Initially on scheduled Ativan for severe withdrawal symptoms but subsequently stopped as he was more sedated.  Continues to have withdrawal and intermittent hallucinations but no agitation.   -CIWA protocol as well as scheduled gabapentin  -Vitamins  -Social work consult  -If patient remains confused once withdrawal has resolved then would consider OT consult versus MRI of the brain, at this time hold on that as I think his confusion is related to alcohol withdrawal and there is no focal neurological deficits     2.   "Hyponatremia - Resolved: Likely hypovolemic, resolved.    3. Hypokalemia and Hypophosphatemia: Due to significant alcohol intake and poor diet.  Replace per protocol.     3.  Acute alcoholic transaminitis: Transaminitis picture consistent with alcohol use.  Certainly needs alcohol cessation.    Labs improving.     4.  Hypertension: Continue PTA Metoprolol.    5.  Urinary Retention: Patient has urinary retention.  He has required straight catheterization multiple times. Started on Flomax.  Traylor placed on 9/20 for persistent retention.  Prior to discharge would recommend voiding trial.    Diet: Combination Diet Regular Diet Adult    DVT Prophylaxis: Pneumatic Compression Devices  Traylor Catheter: in place, indication: Retention  Code Status: Full Code      Disposition Plan   Expected discharge: 2 - 3 days, recommended to prior living arrangement once withdrawal resolved.  Entered: Liza Jordan MD 09/22/2020, 9:50 AM       The patient's care was discussed with the Bedside Nurse, Patient and Patient's Family.    Hospitalist Service  [facility]          Interval History (Subjective):      Patient states he is \"moving slow\" this morning.  He also admits that at times he is seeing people who are not really there.  He states this happens in the morning before he eats.  Currently states I am the only one in the room.  When I asked where he was he stated that he was in [place].  I asked what type of building this was and he stated a hospital or rehab.  He denies nausea, emesis, abdominal pain, CP or SOB.    I spoke with patient's mother, Marcia, by phone.  When he is sober and doing well he is not confused at all.  She notes that he was hallucinating when she saw him prior to admission when they brought him to the hospital initially.  He was talking about things that were not making sense, seeing people who were not there.  When she talked to him yesterday he seemed to be " "confused at times but other times he seemed better.  She states he quit cold turkey several years ago until he started acting strange and was hospitalized for withdrawal and was hallucinating terribly. He then got treatment through [facility] and he was sober for a long time.  They suspect that he has been drinking on and off for several months but they are not sure.  He had been working at [employer] but hasn't been to work recently.  [place] the patient lives alone in his own home.  The family didn't know he was drinking as much as he was when they noticed that there was a big change in his behavior.  [place], [place]  [place].                  Physical Exam:      Last Vital Signs:  BP (!) 136/99 (BP Location: Right arm)   Pulse 102   Temp 99.1  F (37.3  C) (Oral)   Resp 18   Ht 1.753 m (5' 9\")   SpO2 95%   BMI 24.81 kg/m      General: Sitting up in a chair eating breakfast, NAD  HEENT: Normocephalic and atraumatic, eyes anicteric and without scleral injection, EOMI, face symmetric, dry MM.  Cardiac: RRR, normal S1, S2. No m/g/r, no LE edema.  Pulmonary: Normal chest rise, normal work of breathing.  Lungs CTAB without crackles or wheezing.  Abdomen: soft, non-tender, non-distended.  Normoactive bowel sounds, no guarding or rebound tenderness.  Extremities: no deformities.  Warm, well perfused.  Skin: Significant bruising on left anterior chest.  Warm and Dry.  Neuro: No focal deficits.  Speech slow but clear.  No tremor.  Diffusely very weak.  Psych: Alert and oriented to person, aware he is in the hospital, no hallucinations at this time. Appropriate affect.         Medications:      All current medications were reviewed with changes reflected in problem list.         Data:      All new lab and imaging data was reviewed.   Labs:  Recent Labs   Lab 09/22/20  0614 " 09/20/20  0605 09/19/20  0540   WBC 9.3 9.0 6.7   HGB 11.4* 10.5* 11.3*   HCT 34.0* 31.5* 33.3*   * 104* 100    240 226     Recent Labs   Lab 09/22/20  0614 09/21/20  0613 09/20/20  0605 09/19/20  0540  09/18/20  1254     --  141 139  --  130*   POTASSIUM 3.7  --  4.7 4.0   < > 2.4*   CHLORIDE 103  --  110* 107  --  90*   CO2 27  --  30 28  --  30   ANIONGAP 6  --  1* 4  --  10   GLC 91  --  102* 94  --  111*   BUN 6*  --  6* 6*  --  14   CR 0.68  --  0.78 0.68  --  0.59*   GFRESTIMATED >90  --  >90 >90  --  >90   GFRESTBLACK >90  --  >90 >90  --  >90   DENAE 8.6  --  8.1* 8.0*  --  8.9   MAG  --   --  2.1  --   --  2.2   PHOS  --  3.5 2.2* 2.0*   < >  --    PROTTOTAL 5.6*  --  5.1* 5.7*  --  6.6*   ALBUMIN 2.5*  --  2.4* 2.8*  --  3.3*   BILITOTAL 1.1  --  1.1 1.5*  --  2.1*   ALKPHOS 140  --  108 98  --  117   AST 84*  --  108* 121*  --  158*   ALT 88*  --  79* 76*  --  85*    < > = values in this interval not displayed.      Imaging:   No results found for this or any previous visit (from the past 24 hour(s)).    Liza Jordan MD

## 2020-09-22 NOTE — PROGRESS NOTES
"BP (!) 127/93   Pulse 104   Temp 98  F (36.7  C) (Oral)   Resp 16   Ht 1.753 m (5' 9\")   SpO2 100%   BMI 24.81 kg/m    Patient alert and confused. Hallucinating/ talking to person in the room at times.  Calm and cooperative. CIWA score 2. Trayolr patent. Slow to respond. Alarms on for safety. Will continue to monitor and provide supportive cares.   "

## 2020-09-22 NOTE — PLAN OF CARE
Forgetful and groggy this am. Needs alarms as does not call .  Up to chair for meals and ambulated in hallx2 with walker +one.  Does need occas. Cues. No ativan given this shift  Tele SR

## 2020-09-23 LAB
MAGNESIUM SERPL-MCNC: 2 MG/DL (ref 1.6–2.3)
PHOSPHATE SERPL-MCNC: 4.6 MG/DL (ref 2.5–4.5)
POTASSIUM SERPL-SCNC: 3.7 MMOL/L (ref 3.4–5.3)

## 2020-09-23 PROCEDURE — 84132 ASSAY OF SERUM POTASSIUM: CPT | Performed by: INTERNAL MEDICINE

## 2020-09-23 PROCEDURE — 83735 ASSAY OF MAGNESIUM: CPT | Performed by: INTERNAL MEDICINE

## 2020-09-23 PROCEDURE — 36415 COLL VENOUS BLD VENIPUNCTURE: CPT | Performed by: INTERNAL MEDICINE

## 2020-09-23 PROCEDURE — 25000132 ZZH RX MED GY IP 250 OP 250 PS 637: Performed by: INTERNAL MEDICINE

## 2020-09-23 PROCEDURE — 12000000 ZZH R&B MED SURG/OB

## 2020-09-23 PROCEDURE — 84100 ASSAY OF PHOSPHORUS: CPT | Performed by: INTERNAL MEDICINE

## 2020-09-23 PROCEDURE — 99232 SBSQ HOSP IP/OBS MODERATE 35: CPT | Performed by: INTERNAL MEDICINE

## 2020-09-23 RX ADMIN — GABAPENTIN 100 MG: 100 CAPSULE ORAL at 21:21

## 2020-09-23 RX ADMIN — THIAMINE HCL TAB 100 MG 100 MG: 100 TAB at 16:38

## 2020-09-23 RX ADMIN — TAMSULOSIN HYDROCHLORIDE 0.4 MG: 0.4 CAPSULE ORAL at 09:34

## 2020-09-23 RX ADMIN — FOLIC ACID 1 MG: 1 TABLET ORAL at 09:34

## 2020-09-23 RX ADMIN — THIAMINE HCL TAB 100 MG 100 MG: 100 TAB at 09:34

## 2020-09-23 RX ADMIN — GABAPENTIN 100 MG: 100 CAPSULE ORAL at 09:34

## 2020-09-23 RX ADMIN — GABAPENTIN 100 MG: 100 CAPSULE ORAL at 16:38

## 2020-09-23 RX ADMIN — THIAMINE HCL TAB 100 MG 100 MG: 100 TAB at 21:22

## 2020-09-23 RX ADMIN — MULTIPLE VITAMINS W/ MINERALS TAB 1 TABLET: TAB at 09:34

## 2020-09-23 RX ADMIN — METOPROLOL TARTRATE 50 MG: 50 TABLET, FILM COATED ORAL at 09:34

## 2020-09-23 ASSESSMENT — ACTIVITIES OF DAILY LIVING (ADL)
ADLS_ACUITY_SCORE: 20
ADLS_ACUITY_SCORE: 21

## 2020-09-23 NOTE — PROGRESS NOTES
[facility]  Hospitalist Progress Note  Pawel Portillo MD, MD 09/23/2020  (Text Page)  Reason for Stay (Diagnosis): Alcohol withdrawals         Assessment and Plan:      Summary of Stay: Summary of Stay: Omega Guthrie is a 50 year old male with PMH including alcohol use disorder with prior alcohol withdrawal and hypertension who was admitted on 9/18/2020 directly from [facility] emergency room as there were no open medical beds with acute alcohol withdrawal.  Slowly improving although remains confused. Is having intermittent visual hallucinations.     Problem List/Assessment and Plan:      1.  Alcohol use disorder with acute alcohol withdrawal and Acute Alcoholic Encephalopathy and Hallucinations: Patient had previously been sober, started drinking heavily again (unclear exact timeline).  Family noticed that he was behaving like he was drinking and developing withdrawal, parents came down from [facility] and brought him to the hospital for acute withdrawal (at that time was confused, hallucinating).  Transferred from [facility] ER for ongoing care due to lack of beds.  Symptoms of alcohol withdrawal included significant tremulousness, HTN and tachycardia. He was also confused with intermittent hallucinations.  He had a CT head which showed no acute pathology.   suspected his confusion is related to alcohol use and withdrawal, overall orientation improving but still not back to baseline.    -CIWA protocol as well as scheduled gabapentin  -On thiamine, folic acid and multivitamins  -Social work consult  -His overall mental status is improving.  -Patient stated that he follows with AA, EtOH cessation program as outpatient    2.  Hyponatremia - Resolved: Likely hypovolemic, resolved.     3. Hypokalemia and Hypophosphatemia: Due to significant alcohol intake and poor diet.  Replace per protocol.     3.  Acute alcoholic transaminitis: Transaminitis picture consistent with alcohol use.  Certainly needs alcohol  "cessation.    Labs improving.     4.  Hypertension: Continue PTA Metoprolol.     5.  Urinary Retention: Patient has urinary retention.  He has required straight catheterization multiple times. Started on Flomax.  Traylor placed on 9/20 for persistent retention.  Prior to discharge would recommend voiding trial.  -Plans to do voiding trial later today or tomorrow once he is more ambulatory     Diet: Combination Diet Regular Diet Adult    DVT Prophylaxis: Pneumatic Compression Devices  Traylor Catheter: in place, indication: Retention  Code Status: Full Code          Disposition Plan     Expected discharge: 1 to 2 days, hopeful for home discharge        Interval History (Subjective):      I assume service care today.  Seen and examined.  Chart reviewed.  Case discussed with nursing service.  Omega feels a little better.  He is still feeling weak but reportedly is more ambulatory and tolerating oral diet.  He is interactive, following simple verbal instructions.  No seizure-like activity.  Remain afebrile.  Not requiring any oxygen supplementation.         Physical Exam:      Last Vital Signs:  /79 (BP Location: Left arm)   Pulse 86   Temp 98.4  F (36.9  C) (Oral)   Resp 14   Ht 1.753 m (5' 9\")   SpO2 93%   BMI 24.81 kg/m      I/O last 3 completed shifts:  In: 1183 [P.O.:1180; I.V.:3]  Out: 3800 [Urine:3800]  Wt Readings from Last 1 Encounters:   09/18/20 76.2 kg (168 lb)     There were no vitals filed for this visit.    Constitutional: Awake, alert, cooperative, no apparent distress   Respiratory: Clear to auscultation bilaterally, no crackles or wheezing   Cardiovascular: Regular rate and rhythm, normal S1 and S2, and no murmur noted   Abdomen: Normal bowel sounds, soft, non-distended, non-tender   Skin: No rashes, no cyanosis, dry to touch   Neuro: Alert and oriented x2, no weakness, spontaneous and coherent speech   Extremities: No edema, normal range of motion   Other(s): Euthymic mood, not agitated    " Weak appearing   All other systems: Negative          Medications:      All current medications were reviewed with changes reflected in problem list.         Data:      All new lab and imaging data was reviewed.   Labs:  No results for input(s): CULT in the last 168 hours.  Recent Labs   Lab 09/22/20  0614 09/20/20  0605 09/19/20  0540   WBC 9.3 9.0 6.7   HGB 11.4* 10.5* 11.3*   HCT 34.0* 31.5* 33.3*   * 104* 100    240 226     Recent Labs   Lab 09/23/20  0637 09/22/20  0614 09/21/20  0613 09/20/20  0605 09/19/20  0540  09/18/20  1254   NA  --  136  --  141 139  --  130*   POTASSIUM 3.7 3.7  --  4.7 4.0   < > 2.4*   CHLORIDE  --  103  --  110* 107  --  90*   CO2  --  27  --  30 28  --  30   ANIONGAP  --  6  --  1* 4  --  10   GLC  --  91  --  102* 94  --  111*   BUN  --  6*  --  6* 6*  --  14   CR  --  0.68  --  0.78 0.68  --  0.59*   GFRESTIMATED  --  >90  --  >90 >90  --  >90   GFRESTBLACK  --  >90  --  >90 >90  --  >90   DENAE  --  8.6  --  8.1* 8.0*  --  8.9   MAG 2.0  --   --  2.1  --   --  2.2   PHOS 4.6*  --  3.5 2.2* 2.0*   < >  --    PROTTOTAL  --  5.6*  --  5.1* 5.7*  --  6.6*   ALBUMIN  --  2.5*  --  2.4* 2.8*  --  3.3*   BILITOTAL  --  1.1  --  1.1 1.5*  --  2.1*   ALKPHOS  --  140  --  108 98  --  117   AST  --  84*  --  108* 121*  --  158*   ALT  --  88*  --  79* 76*  --  85*    < > = values in this interval not displayed.     No results for input(s): SED, CRP in the last 168 hours.  Recent Labs   Lab 09/22/20  0614 09/20/20  0605 09/19/20  0540 09/18/20  1254   GLC 91 102* 94 111*     No results for input(s): INR in the last 168 hours.  No results for input(s): TROPONIN, TROPI, TROPR in the last 168 hours.    Invalid input(s): TROP, TROPONINIES  Recent Labs   Lab 09/18/20  1809   COLOR Light Yellow   APPEARANCE Clear   URINEGLC 70*   URINEBILI Negative   URINEKETONE 5*   SG 1.003   UBLD Negative   URINEPH 6.0   PROTEIN Negative   NITRITE Negative   LEUKEST Negative      Imaging:   Results  for orders placed or performed during the hospital encounter of 09/18/20   Head CT w/o contrast    Narrative    CT SCAN OF THE HEAD WITHOUT CONTRAST   9/18/2020 5:14 PM     HISTORY: Altered level of consciousness (LOC), altered mental status,  unexplained    TECHNIQUE:  Axial images of the head and coronal reformations without  IV contrast material. Radiation dose for this scan was reduced using  automated exposure control, adjustment of the mA and/or kV according  to patient size, or iterative reconstruction technique.    COMPARISON: None.    FINDINGS:  Mildly motion artifact. Mild volume loss is present. White matter  hypoattenuation likely represents mild chronic small vessel ischemic  change. The cerebral hemispheres, brainstem, and cerebellum otherwise  demonstrate normal morphology and attenuation. No evidence of acute  ischemia, hemorrhage, mass, mass effect or hydrocephalus. The  visualized calvarium, tympanic cavities, mastoid cavities, and  paranasal sinuses are unremarkable. Densities in the bilateral  external auditory canals, presumably cerumen.      Impression    IMPRESSION:   No acute intracranial abnormality.    LUCI PONCE MD

## 2020-09-23 NOTE — PLAN OF CARE
Diagnosis. Alcohol Withdrawal  History. Alcohol use disorder, HTN  Vitals. VSS  Tele. SR  Respiratory. WDL  Neuro. Patient lethargic and slow to respond. Disoriented to time. CIWA score of 1,1.   GI/. Patient has catheter with good output.   Skin. Generally bruised.   LDA's. Left and Right PIV saline locked, CDI  Diet. Combo regular diet  Activity. Assist of 1 with walker.  Plan. Discharge back to prior living arrangements after 2-3 days.

## 2020-09-23 NOTE — PLAN OF CARE
Denies pain, CP, SOB. CIWA 3. Assist of 1 with gait belt and walker. Lethargic. Oriented x4. Continue to monitor and with plan of care.

## 2020-09-23 NOTE — PLAN OF CARE
Forgetful and a bit slow with responces but improved.  Up walking with walker SBA   Labs belem.  Tele SR

## 2020-09-24 PROCEDURE — 25000132 ZZH RX MED GY IP 250 OP 250 PS 637: Performed by: INTERNAL MEDICINE

## 2020-09-24 PROCEDURE — 12000000 ZZH R&B MED SURG/OB

## 2020-09-24 PROCEDURE — 99232 SBSQ HOSP IP/OBS MODERATE 35: CPT | Performed by: INTERNAL MEDICINE

## 2020-09-24 RX ADMIN — THIAMINE HCL TAB 100 MG 100 MG: 100 TAB at 21:39

## 2020-09-24 RX ADMIN — GABAPENTIN 100 MG: 100 CAPSULE ORAL at 21:39

## 2020-09-24 RX ADMIN — THIAMINE HCL TAB 100 MG 100 MG: 100 TAB at 16:29

## 2020-09-24 RX ADMIN — GABAPENTIN 100 MG: 100 CAPSULE ORAL at 08:33

## 2020-09-24 RX ADMIN — MULTIPLE VITAMINS W/ MINERALS TAB 1 TABLET: TAB at 08:33

## 2020-09-24 RX ADMIN — GABAPENTIN 100 MG: 100 CAPSULE ORAL at 16:29

## 2020-09-24 RX ADMIN — TAMSULOSIN HYDROCHLORIDE 0.4 MG: 0.4 CAPSULE ORAL at 08:33

## 2020-09-24 RX ADMIN — FOLIC ACID 1 MG: 1 TABLET ORAL at 08:33

## 2020-09-24 RX ADMIN — THIAMINE HCL TAB 100 MG 100 MG: 100 TAB at 08:33

## 2020-09-24 RX ADMIN — METOPROLOL TARTRATE 50 MG: 50 TABLET, FILM COATED ORAL at 21:39

## 2020-09-24 ASSESSMENT — ACTIVITIES OF DAILY LIVING (ADL)
RETIRED_COMMUNICATION: 0-->UNDERSTANDS/COMMUNICATES WITHOUT DIFFICULTY
ADLS_ACUITY_SCORE: 21
AMBULATION: 0-->INDEPENDENT
DRESS: 0-->INDEPENDENT
TOILETING: 0-->INDEPENDENT
TRANSFERRING: 0-->INDEPENDENT
ADLS_ACUITY_SCORE: 17
SWALLOWING: 0-->SWALLOWS FOODS/LIQUIDS WITHOUT DIFFICULTY
NUMBER_OF_TIMES_PATIENT_HAS_FALLEN_WITHIN_LAST_SIX_MONTHS: 2
FALL_HISTORY_WITHIN_LAST_SIX_MONTHS: YES
ADLS_ACUITY_SCORE: 21
COGNITION: 1 - ATTENTION OR MEMORY DEFICITS
BATHING: 0-->INDEPENDENT
ADLS_ACUITY_SCORE: 17
ADLS_ACUITY_SCORE: 17
RETIRED_EATING: 0-->INDEPENDENT
ADLS_ACUITY_SCORE: 21
WHICH_OF_THE_ABOVE_FUNCTIONAL_RISKS_HAD_A_RECENT_ONSET_OR_CHANGE?: AMBULATION

## 2020-09-24 NOTE — PROGRESS NOTES
[facility]  Hospitalist Progress Note  Pawel Portillo MD, MD 09/24/2020  (Text Page)  Reason for Stay (Diagnosis): Alcohol withdrawals         Assessment and Plan:      Summary of Stay: Summary of Stay: Omega Guthrie is a 50 year old male with PMH including alcohol use disorder with prior alcohol withdrawal and hypertension who was admitted on 9/18/2020 directly from [facility] emergency room as there were no open medical beds with acute alcohol withdrawal.  Slowly improving although remains confused. Is having intermittent visual hallucinations.     Problem List/Assessment and Plan:      1.  Alcohol use disorder with acute alcohol withdrawal and Acute Alcoholic Encephalopathy and Hallucinations: Patient had previously been sober, started drinking heavily again (unclear exact timeline).  Family noticed that he was behaving like he was drinking and developing withdrawal, parents came down from [facility] and brought him to the hospital for acute withdrawal (at that time was confused, hallucinating).  Transferred from [facility] ER for ongoing care due to lack of beds.  Symptoms of alcohol withdrawal included significant tremulousness, HTN and tachycardia. He was also confused with intermittent hallucinations.  He had a CT head which showed no acute pathology.   suspected his confusion is related to alcohol use and withdrawal, overall orientation improving but still not back to baseline.    -CIWA protocol as well as scheduled gabapentin  -On thiamine, folic acid and multivitamins  -Social work consult  -His overall mental status is improving.  -Patient stated that he follows with AA, EtOH cessation program as outpatient      2.  Hyponatremia - Resolved: Likely hypovolemic, resolved.     3. Hypokalemia and Hypophosphatemia: Due to significant alcohol intake and poor diet.  Replace per protocol.     3.  Acute alcoholic transaminitis: Transaminitis picture consistent with alcohol use.  Certainly needs  "alcohol cessation.    Labs improving.     4.  Hypertension: Continue PTA Metoprolol.     5.  Urinary Retention: Patient has urinary retention.  He has required straight catheterization multiple times. Started on Flomax.  Traylor placed on 9/20 for persistent retention.  Prior to discharge would recommend voiding trial.  -Voiding trial  Please discontinue indwelling Traylor catheter    Anticipating discharge in the next 24 hours when his parents are back and down as he will be living with them upon hospital discharge     Diet: Combination Diet Regular Diet Adult    DVT Prophylaxis: Pneumatic Compression Devices  Traylor Catheter: in place, indication: Retention  Code Status: Full Code               Interval History (Subjective):      Continuing service care today.  Seen and examined.  Chart reviewed.  Case discussed with nursing service.  I found Omega sleeping but easily aroused with minimal verbal stimuli.  No significant reported events overnight.  He thinks he is improving with his generalized weakness.  He is tolerating oral diet.  No reported nausea, vomiting.  He is not agitated not combative.  He is receptive in quitting complete EtOH intake.  He stated that he will be staying with his parents after discharge.  I was informed that his parents will be back in town tomorrow.           Physical Exam:      Last Vital Signs:  /80 (BP Location: Right arm)   Pulse 90   Temp 98.3  F (36.8  C) (Oral)   Resp 18   Ht 1.753 m (5' 9\")   SpO2 96%   BMI 24.81 kg/m      I/O last 3 completed shifts:  In: 3 [I.V.:3]  Out: 3450 [Urine:3450]  Wt Readings from Last 1 Encounters:   09/18/20 76.2 kg (168 lb)     There were no vitals filed for this visit.    Constitutional: Awake, alert, cooperative, no apparent distress   Respiratory: Clear to auscultation bilaterally, no crackles or wheezing   Cardiovascular: Regular rate and rhythm, normal S1 and S2, and no murmur noted   Abdomen: Normal bowel sounds, soft, non-distended, " non-tender   Skin: No rashes, no cyanosis, dry to touch   Neuro: Alert and oriented x2, no weakness, spontaneous and coherent speech   Extremities: No edema, normal range of motion   Other(s): Euthymic mood, not agitated    Weak appearing   All other systems: Negative          Medications:      All current medications were reviewed with changes reflected in problem list.         Data:      All new lab and imaging data was reviewed.   Labs:  No results for input(s): CULT in the last 168 hours.  Recent Labs   Lab 09/22/20  0614 09/20/20  0605 09/19/20  0540   WBC 9.3 9.0 6.7   HGB 11.4* 10.5* 11.3*   HCT 34.0* 31.5* 33.3*   * 104* 100    240 226     Recent Labs   Lab 09/23/20  0637 09/22/20  0614 09/21/20  0613 09/20/20  0605 09/19/20  0540  09/18/20  1254   NA  --  136  --  141 139  --  130*   POTASSIUM 3.7 3.7  --  4.7 4.0   < > 2.4*   CHLORIDE  --  103  --  110* 107  --  90*   CO2  --  27  --  30 28  --  30   ANIONGAP  --  6  --  1* 4  --  10   GLC  --  91  --  102* 94  --  111*   BUN  --  6*  --  6* 6*  --  14   CR  --  0.68  --  0.78 0.68  --  0.59*   GFRESTIMATED  --  >90  --  >90 >90  --  >90   GFRESTBLACK  --  >90  --  >90 >90  --  >90   DENAE  --  8.6  --  8.1* 8.0*  --  8.9   MAG 2.0  --   --  2.1  --   --  2.2   PHOS 4.6*  --  3.5 2.2* 2.0*   < >  --    PROTTOTAL  --  5.6*  --  5.1* 5.7*  --  6.6*   ALBUMIN  --  2.5*  --  2.4* 2.8*  --  3.3*   BILITOTAL  --  1.1  --  1.1 1.5*  --  2.1*   ALKPHOS  --  140  --  108 98  --  117   AST  --  84*  --  108* 121*  --  158*   ALT  --  88*  --  79* 76*  --  85*    < > = values in this interval not displayed.     No results for input(s): SED, CRP in the last 168 hours.  Recent Labs   Lab 09/22/20  0614 09/20/20  0605 09/19/20  0540 09/18/20  1254   GLC 91 102* 94 111*     No results for input(s): INR in the last 168 hours.  No results for input(s): TROPONIN, TROPI, TROPR in the last 168 hours.    Invalid input(s): TROP, TROPONINIES  Recent Labs   Lab  09/18/20  1809   COLOR Light Yellow   APPEARANCE Clear   URINEGLC 70*   URINEBILI Negative   URINEKETONE 5*   SG 1.003   UBLD Negative   URINEPH 6.0   PROTEIN Negative   NITRITE Negative   LEUKEST Negative      Imaging:   Results for orders placed or performed during the hospital encounter of 09/18/20   Head CT w/o contrast    Narrative    CT SCAN OF THE HEAD WITHOUT CONTRAST   9/18/2020 5:14 PM     HISTORY: Altered level of consciousness (LOC), altered mental status,  unexplained    TECHNIQUE:  Axial images of the head and coronal reformations without  IV contrast material. Radiation dose for this scan was reduced using  automated exposure control, adjustment of the mA and/or kV according  to patient size, or iterative reconstruction technique.    COMPARISON: None.    FINDINGS:  Mildly motion artifact. Mild volume loss is present. White matter  hypoattenuation likely represents mild chronic small vessel ischemic  change. The cerebral hemispheres, brainstem, and cerebellum otherwise  demonstrate normal morphology and attenuation. No evidence of acute  ischemia, hemorrhage, mass, mass effect or hydrocephalus. The  visualized calvarium, tympanic cavities, mastoid cavities, and  paranasal sinuses are unremarkable. Densities in the bilateral  external auditory canals, presumably cerumen.      Impression    IMPRESSION:   No acute intracranial abnormality.    LUCI PONCE MD

## 2020-09-24 NOTE — PLAN OF CARE
Diagnosis. Alcohol Withdrawal  History. Alcohol use disorder, HTN  Vitals. VSS  Tele. SR  Respiratory. WDL  Neuro. Patient lethargic and slow to respond. Disoriented to time. CIWA score of 2,2.   GI/. Patient has catheter with good output. Patient had loose BM on shift.   Skin. Generally bruised.   LDA's. Left and Right PIV saline locked, CDI  Diet. Combo regular diet  Activity. Assist of 1 with walker.  Plan. Discharge back to prior living arrangements after 1-2 days.

## 2020-09-24 NOTE — PLAN OF CARE
Admission: Pt admitted on 9/18 for evaluation of alcohol detox. Stated that his last drink was the day before presentation  History: HTN, Alcohol use  Labs/Protocols: Potassium/Mag/Phos Protocols: K+-3.7, Mag-2.0, Phos-4.6  Vitals: Temp: 98.3  F (36.8  C) Temp src: Oral BP: 105/80 Pulse: 90   Resp: 18 SpO2: 96 % O2 Device: None (Room air)    Pain: Denies pain at this time   Tele: SR with st depression   Cardiac: Denies chest pain-Regular rate and rhythm   Respiratory: Lungs are clear throughout-Remains on RA  Neuro: Alert to self, situation, place-Disorientated to time-No hallucinations this shift-CIWA scores this shift: 2,2  GI/: Pagan removed-Pt voided-Incontinent at times of stool   Skin: Intact   LDA: 2 PIV-Saline locked   Diet: Regular-Tolerating this well   Activity: A-1 gait belt   Plan/Education: Continue safe withdrawal, abstain from alcohol,SW consulted for discharge planning and chem dep resources  Discharge Plan: Possibly home tomorrow-Parents will be in town and staying with Pt           12:24 PM  Text paged MD re: Do you want to remove pagan and do trial voiding? Pagan was placed for urine retention

## 2020-09-24 NOTE — PLAN OF CARE
CIWA 1, denies pain, CP, SOB. Assist of 1 with gb, walker. Scabs on R. Knee. Disoriented to time. Stat lock applied to R. Leg for pagan catheter. Continue to monitor and with plan of care.

## 2020-09-25 VITALS
SYSTOLIC BLOOD PRESSURE: 134 MMHG | DIASTOLIC BLOOD PRESSURE: 95 MMHG | OXYGEN SATURATION: 100 % | TEMPERATURE: 98.6 F | HEART RATE: 97 BPM | BODY MASS INDEX: 24.81 KG/M2 | RESPIRATION RATE: 18 BRPM | HEIGHT: 69 IN

## 2020-09-25 PROCEDURE — 25000132 ZZH RX MED GY IP 250 OP 250 PS 637: Performed by: INTERNAL MEDICINE

## 2020-09-25 PROCEDURE — 99239 HOSP IP/OBS DSCHRG MGMT >30: CPT | Performed by: INTERNAL MEDICINE

## 2020-09-25 RX ORDER — FOLIC ACID 1 MG/1
1 TABLET ORAL DAILY
Qty: 30 TABLET | Refills: 0 | Status: SHIPPED | OUTPATIENT
Start: 2020-09-25

## 2020-09-25 RX ORDER — LANOLIN ALCOHOL/MO/W.PET/CERES
100 CREAM (GRAM) TOPICAL DAILY
Qty: 30 TABLET | Refills: 0 | Status: SHIPPED | OUTPATIENT
Start: 2020-09-26

## 2020-09-25 RX ORDER — METOPROLOL TARTRATE 50 MG
50 TABLET ORAL 2 TIMES DAILY
Qty: 60 TABLET | Refills: 0 | Status: SHIPPED | OUTPATIENT
Start: 2020-09-25

## 2020-09-25 RX ADMIN — METOPROLOL TARTRATE 50 MG: 50 TABLET, FILM COATED ORAL at 08:29

## 2020-09-25 RX ADMIN — THIAMINE HCL TAB 100 MG 100 MG: 100 TAB at 08:29

## 2020-09-25 RX ADMIN — FOLIC ACID 1 MG: 1 TABLET ORAL at 08:29

## 2020-09-25 RX ADMIN — TAMSULOSIN HYDROCHLORIDE 0.4 MG: 0.4 CAPSULE ORAL at 08:29

## 2020-09-25 RX ADMIN — MULTIPLE VITAMINS W/ MINERALS TAB 1 TABLET: TAB at 08:29

## 2020-09-25 ASSESSMENT — ACTIVITIES OF DAILY LIVING (ADL)
ADLS_ACUITY_SCORE: 17

## 2020-09-25 NOTE — PLAN OF CARE
VSS. Tele SR. Pt is intermittently disoriented to date, otherwise oriented. CIWA scores 2, 1. Denies pain overnight. Scabbing present to BLEs. Pt voiding overnight w/o difficulty. Able to ambulate w/ SBA. Plan for discharge today.

## 2020-09-25 NOTE — PLAN OF CARE
Admission: Pt admitted on 9/18 for evaluation of alcohol detox. Stated that his last drink was the day before presentation  History: HTN, Alcohol use  Labs/Protocols: Potassium/Mag/Phos Protocols: K+-3.7(9/23), Mag-2.0(9/23), Phos-4.6(9/23)  Vitals: Temp: 98.6  F (37  C) Temp src: Oral BP: (!) 134/95 Pulse: 97   Resp: 18 SpO2: 100 % O2 Device: None (Room air)      Pain: Denies pain at this time   Tele: SR with st depression   Cardiac: Denies chest pain-Regular rate and rhythm   Respiratory: Lungs are clear throughout-Remains on RA  Neuro: Alert to self, situation, place-Disorientated to time-No hallucinations this shift-CIWA score this shift: 2  GI/: Continent of B/B-Good urine output since pagan removal on 9/24  Skin: Intact   LDA: 2 PIV-Saline locked   Diet: Regular-Tolerating this well   Activity: A-1 gait belt   Plan/Education: Continue safe withdrawal, abstain from alcohol,SW consulted for discharge planning and chem dep resources  Discharge Plan: Today-Parents will be in town and staying with Pt       10:32 AM  Pt acknowledges receipt of belongings, discharge instructions, education on medications and how to get chemical dep resources. Pt left in stable condition in care of mother roshan.

## 2020-09-25 NOTE — PLAN OF CARE
Diagnosis. Alcohol Withdrawal  History. Alcohol use disorder, HTN  Vitals. VSS  Tele. SR  Respiratory. WDL  Neuro. Patient lethargic and slow to respond. Disoriented to time. CIWA score of 2,1.   GI/.Patient had good output, pagan removed on day shift.    Skin. Generally bruised.   LDA's. Left and Right PIV saline locked, CDI  Diet. Combo regular diet  Activity. Assist of 1 with walker.  Plan. Discharge back to prior living arrangements after 1-2 days.    none

## 2020-09-25 NOTE — DISCHARGE SUMMARY
"[facility]  Discharge Summary  Name: Omega Guthrie    MRN: [id number]  YOB: 1970    Age: 50 year old  Date of Discharge:  9/25/2020  Date of Admission: 9/18/2020  Primary Care Provider: Lyn Villela  Discharge Physician:  Pawel Portillo MD  Discharging Service:  Hospitalist      Discharge Diagnosis:  Metabolic encephalopathy secondary to acute alcohol withdrawals  Multiple electrolyte derangements with hyponatremia, hypokalemia, hypophosphatemia  Alcohol hepatitis  Alcohol abuse  Alcohol dependence  Physical deconditioning  Urinary retention-resolved     Other Diagnosis:  Past Medical History:   Diagnosis Date     Hypertension      Seizures (H)      Substance abuse (H) 2010    Rehab          Discharge Disposition:  Discharged to home     Allergies:  Allergies   Allergen Reactions     Penicillins Other (See Comments)     Facial rash and pain        Discharge Medications:   Discharge Medication List as of 9/25/2020  9:47 AM      START taking these medications    Details   folic acid (FOLVITE) 1 MG tablet Take 1 tablet (1 mg) by mouth daily, Disp-30 tablet,R-0, E-Prescribe      thiamine (B-1) 100 MG tablet Take 1 tablet (100 mg) by mouth daily, Disp-30 tablet,R-0, E-Prescribe         CONTINUE these medications which have NOT CHANGED    Details   multivitamin w/minerals (MULTI-VITAMIN) tablet Take 1 tablet by mouth daily, Historical      vitamin D3 (CHOLECALCIFEROL) 50 mcg (2000 units) tablet Take 1 tablet by mouth daily, Historical      metoprolol tartrate (LOPRESSOR) 50 MG tablet Take 50 mg by mouth 2 times daily, Historical              Condition on Discharge:  Discharge condition: Stable   Discharge vitals: Blood pressure (!) 134/95, pulse 97, temperature 98.6  F (37  C), temperature source Oral, resp. rate 18, height 1.753 m (5' 9\"), SpO2 100 %.   Code status on discharge: Full Code     History of Present Illness:  See detailed admission note for full details.        Significant " Physical Exam Findings Day of Discharge:  HEENT; Atraumatic, normocephalic, pinkish conjuctiva, pupils bilateral reactive   Skin: warm and moist, no rashes  Lungs: equal chest expansion, clear to auscultation, no wheezes, no stridor, no crackles,   Heart: normal rate, normal rhythm, no rubs or gallops.   Abdomen: normal bowel sounds, no tenderness, no peritoneal signs, no guarding  Extremities: no deformities, no edema   Neuro; follow commands, alert and oriented x3, spontaneous speech, coherent, moves all extremities spontaneously  Psych; no hallucination, euthymic mood, not agitated        Procedures other than Imaging:  none     Imaging:  Results for orders placed or performed during the hospital encounter of 09/18/20   Head CT w/o contrast    Narrative    CT SCAN OF THE HEAD WITHOUT CONTRAST   9/18/2020 5:14 PM     HISTORY: Altered level of consciousness (LOC), altered mental status,  unexplained    TECHNIQUE:  Axial images of the head and coronal reformations without  IV contrast material. Radiation dose for this scan was reduced using  automated exposure control, adjustment of the mA and/or kV according  to patient size, or iterative reconstruction technique.    COMPARISON: None.    FINDINGS:  Mildly motion artifact. Mild volume loss is present. White matter  hypoattenuation likely represents mild chronic small vessel ischemic  change. The cerebral hemispheres, brainstem, and cerebellum otherwise  demonstrate normal morphology and attenuation. No evidence of acute  ischemia, hemorrhage, mass, mass effect or hydrocephalus. The  visualized calvarium, tympanic cavities, mastoid cavities, and  paranasal sinuses are unremarkable. Densities in the bilateral  external auditory canals, presumably cerumen.      Impression    IMPRESSION:   No acute intracranial abnormality.    LUCI PONCE MD        Consultations:  No consultations were requested during this admission.     Recent Lab Results:  Recent Labs   Lab  09/22/20  0614 09/20/20  0605 09/19/20  0540   WBC 9.3 9.0 6.7   HGB 11.4* 10.5* 11.3*   HCT 34.0* 31.5* 33.3*   * 104* 100    240 226     No results for input(s): CULT in the last 168 hours.  Recent Labs   Lab 09/23/20  0637 09/22/20  0614 09/21/20  0613 09/20/20  0605 09/19/20  0540  09/18/20  1254   NA  --  136  --  141 139  --  130*   POTASSIUM 3.7 3.7  --  4.7 4.0   < > 2.4*   CHLORIDE  --  103  --  110* 107  --  90*   CO2  --  27  --  30 28  --  30   ANIONGAP  --  6  --  1* 4  --  10   GLC  --  91  --  102* 94  --  111*   BUN  --  6*  --  6* 6*  --  14   CR  --  0.68  --  0.78 0.68  --  0.59*   GFRESTIMATED  --  >90  --  >90 >90  --  >90   GFRESTBLACK  --  >90  --  >90 >90  --  >90   DENAE  --  8.6  --  8.1* 8.0*  --  8.9   MAG 2.0  --   --  2.1  --   --  2.2   PHOS 4.6*  --  3.5 2.2* 2.0*   < >  --    PROTTOTAL  --  5.6*  --  5.1* 5.7*  --  6.6*   ALBUMIN  --  2.5*  --  2.4* 2.8*  --  3.3*   BILITOTAL  --  1.1  --  1.1 1.5*  --  2.1*   ALKPHOS  --  140  --  108 98  --  117   AST  --  84*  --  108* 121*  --  158*   ALT  --  88*  --  79* 76*  --  85*    < > = values in this interval not displayed.     Recent Labs   Lab 09/22/20  0614 09/20/20  0605 09/19/20  0540 09/18/20  1254   GLC 91 102* 94 111*     Recent Labs   Lab 09/18/20  1254   LACT 1.8     No results for input(s): TROPONIN, TROPI, TROPR in the last 168 hours.    Invalid input(s): TROP, TROPONINIES  Recent Labs   Lab 09/18/20  1809   COLOR Light Yellow   APPEARANCE Clear   URINEGLC 70*   URINEBILI Negative   URINEKETONE 5*   SG 1.003   UBLD Negative   URINEPH 6.0   PROTEIN Negative   NITRITE Negative   LEUKEST Negative          Pending Results:    Unresulted Labs Ordered in the Past 30 Days of this Admission     No orders found from 8/19/2020 to 9/19/2020.           Discharge Instructions and Follow-Up:   Discharge diet: Orders Placed This Encounter      Combination Diet Regular Diet Adult      Diet     Discharge activity: Activity as  tolerated   Discharge follow-up: 1-2 weeks with PCP  Alcohol cessation program   Outpatient therapy: None    Other instructions: None      Hospital Course:  Omega is feeling a whole lot better this morning.  No significant reported events overnight no longer scoring with CIWA protocol.  Stable hemodynamics.  Tolerating oral diet.  No nausea or vomiting.  Passed voiding trial with removal of indwelling Traylor catheter yesterday as this was inserted for significant urinary retention.  Currently receiving thiamine and folic acid.  Previously received CIWA benzodiazepine triggered protocol and gabapentin was subsequently discontinued due to increasing sedation and being more lethargic.  He is awake, alert, oriented this morning and willing to work with his establish EtOH cessation program.  He will be staying with his parents in the interim while recovering from this recent bout of hospitalization brought about by EtOH complications with withdrawals, hepatitis and encephalopathy.    I will refer you to excerpts of prior progress notes as listed below for other details of his hospital stay.  Summary of Stay: Summary of Stay: Omega Guthrie is a 50 year old male with PMH including alcohol use disorder with prior alcohol withdrawal and hypertension who was admitted on 9/18/2020 directly from [facility] emergency room as there were no open medical beds with acute alcohol withdrawal.  Slowly improving although remains confused. Is having intermittent visual hallucinations.     Problem List/Assessment and Plan:      1.  Alcohol use disorder with acute alcohol withdrawal and Acute Alcoholic Encephalopathy and Hallucinations: Patient had previously been sober, started drinking heavily again (unclear exact timeline).  Family noticed that he was behaving like he was drinking and developing withdrawal, parents came down from [facility] and brought him to the hospital for acute withdrawal (at that time was confused, hallucinating).   Transferred from [facility] ER for ongoing care due to lack of beds.  Symptoms of alcohol withdrawal included significant tremulousness, HTN and tachycardia. He was also confused with intermittent hallucinations.  He had a CT head which showed no acute pathology.   suspected his confusion is related to alcohol use and withdrawal, overall orientation improving but still not back to baseline.    -CIWA protocol as well as scheduled gabapentin  -On thiamine, folic acid and multivitamins  -Social work consult  -His overall mental status is improving.  -Patient stated that he follows with AA, EtOH cessation program as outpatient        2.  Hyponatremia - Resolved: Likely hypovolemic, resolved.     3. Hypokalemia and Hypophosphatemia: Due to significant alcohol intake and poor diet.  Replace per protocol.     3.  Acute alcoholic transaminitis: Transaminitis picture consistent with alcohol use.  Certainly needs alcohol cessation.    Labs improving.     4.  Hypertension: Continue PTA Metoprolol.     5.  Urinary Retention: Patient has urinary retention.  He has required straight catheterization multiple times. Started on Flomax.  Traylor placed on 9/20 for persistent retention.  Prior to discharge would recommend voiding trial.  -Voiding trial  Please discontinue indwelling Traylor catheter     Anticipating discharge in the next 24 hours when his parents are back and down as he will be living with them upon hospital discharge     Total time spent in face to face contact with the patient and coordinating discharge was:  > 30 Minutes.